# Patient Record
Sex: FEMALE | Race: BLACK OR AFRICAN AMERICAN | NOT HISPANIC OR LATINO | Employment: UNEMPLOYED | URBAN - METROPOLITAN AREA
[De-identification: names, ages, dates, MRNs, and addresses within clinical notes are randomized per-mention and may not be internally consistent; named-entity substitution may affect disease eponyms.]

---

## 2017-01-03 ENCOUNTER — OFFICE VISIT (OUTPATIENT)
Dept: RHEUMATOLOGY | Facility: CLINIC | Age: 42
End: 2017-01-03
Payer: COMMERCIAL

## 2017-01-03 ENCOUNTER — HOSPITAL ENCOUNTER (OUTPATIENT)
Dept: LAB | Facility: CLINIC | Age: 42
Discharge: HOME | End: 2017-01-03
Attending: INTERNAL MEDICINE | Admitting: INTERNAL MEDICINE
Payer: COMMERCIAL

## 2017-01-03 VITALS
HEART RATE: 85 BPM | SYSTOLIC BLOOD PRESSURE: 113 MMHG | TEMPERATURE: 97.4 F | WEIGHT: 187 LBS | BODY MASS INDEX: 30.2 KG/M2 | DIASTOLIC BLOOD PRESSURE: 76 MMHG

## 2017-01-03 DIAGNOSIS — R76.0 ANTINUCLEAR ANTIBODY (ANA) TITER GREATER THAN 1:80: ICD-10-CM

## 2017-01-03 DIAGNOSIS — R76.0 ANTINUCLEAR ANTIBODY (ANA) TITER GREATER THAN 1:80: Primary | ICD-10-CM

## 2017-01-03 LAB
ALBUMIN SERPL-MCNC: 3.6 G/DL (ref 3.4–5)
ALP SERPL-CCNC: 89 U/L (ref 40–150)
ALT SERPL W P-5'-P-CCNC: 21 U/L (ref 0–50)
AST SERPL W P-5'-P-CCNC: 19 U/L (ref 0–45)
BILIRUB DIRECT SERPL-MCNC: <0.1 MG/DL (ref 0–0.2)
BILIRUB SERPL-MCNC: 0.3 MG/DL (ref 0.2–1.3)
HCV AB SERPL QL IA: NORMAL
PROT SERPL-MCNC: 7.9 G/DL (ref 6.8–8.8)

## 2017-01-03 PROCEDURE — 36415 COLL VENOUS BLD VENIPUNCTURE: CPT | Performed by: INTERNAL MEDICINE

## 2017-01-03 PROCEDURE — 83516 IMMUNOASSAY NONANTIBODY: CPT | Performed by: INTERNAL MEDICINE

## 2017-01-03 PROCEDURE — 86235 NUCLEAR ANTIGEN ANTIBODY: CPT | Performed by: INTERNAL MEDICINE

## 2017-01-03 PROCEDURE — 86225 DNA ANTIBODY NATIVE: CPT | Performed by: INTERNAL MEDICINE

## 2017-01-03 PROCEDURE — 86803 HEPATITIS C AB TEST: CPT | Performed by: INTERNAL MEDICINE

## 2017-01-03 PROCEDURE — 99203 OFFICE O/P NEW LOW 30 MIN: CPT | Performed by: INTERNAL MEDICINE

## 2017-01-03 PROCEDURE — 80076 HEPATIC FUNCTION PANEL: CPT | Performed by: INTERNAL MEDICINE

## 2017-01-03 NOTE — PROGRESS NOTES
REFERRING PHYSICIAN:  Gio Valles PA-C.       CHIEF COMPLAINT:  Positive ALEKSANDRA.      HISTORY:  Tye Morales is a 42-year-old female who was in her usual state of health until approximately 6 weeks ago when she developed a sudden onset of joint pain with some mild swelling involving the hands and shoulders.  Was seen by her primary care physician and had labs performed and was found to have a positive ALEKSANDRA of 5.7, normal CPK, normal CBC, sed rate of 30.  Also of note, but not commented on, the followup appointment was that she had an AST of 75 and an ALT of 104.  The patient was placed on 60 mg of prednisone with a taper and had dramatic improvement, which remains until this day.  She says she is a little bit achy today, but is mostly significantly improved as compared to prior to starting the prednisone.        She denies any alcohol intake and any history of liver problems.  She has not had any type of rash, photosensitive rash, malar rash, stomatitis or Raynaud's.  She does not have dry eyes or dry mouth.  There is no history of chest pain, progressive dyspnea or shortness of breath.  She has never had pleurisy, pericarditis, hepatitis, or nephritis.  She has also never had a seizure, stroke or blood clot.      PAST MEDICAL HISTORY:  Iron deficiency anemia, gastroesophageal reflux disease, external hemorrhoids, vitamin D deficiency, allergic rhinitis.      MEDICATIONS:     1.  Meclizine 12.5 mg p.r.n.   2.  Prednisone has been finished.   3.  Ibuprofen.   4.  Iron.   5.  Prenatal vitamins.      DRUG ALLERGIES:  None.      SOCIAL HISTORY:  Is not a smoker or drinker.      FAMILY HISTORY:  There is nobody in the family with lupus.      REVIEW OF SYSTEMS:  As noted above.      PHYSICAL EXAMINATION:     GENERAL:  The patient arises approximately 20 minutes late for her appointment.    VITAL SIGNS:  Blood pressure is 113/76, pulse 85, weight 187.     HEENT:  She is normocephalic, atraumatic.  Sclerae are clear and  moist.  Oropharynx without lesions.   NECK:  Supple without lymphadenopathy.   LUNGS:  Clear.   HEART:  Regular without murmur.   ABDOMEN:  Nontender.   JOINT EXAM:  There is no synovitis, erythema, deformities, tenderness of the wrists, MCPs, or PIPs.  There is no synovitis of the elbows, shoulders, knees or ankles.   SKIN:  Without lesions.      IMPRESSION:  Positive ALEKSANDRA and hepatic dysfunction raising the possibility the patient could have autoimmune hepatitis or related disorder; less likely, but still possible, would be systemic lupus.      RECOMMENDATIONS:    1.  We will go ahead and check a mitochondrial antibody as well as smooth muscle antibody for autoimmune hepatitis.  We also check an NAVIN panel, double stranded DNA, repeat her hepatic studies.    2.  We will defer treatment for now, but if she does to have return of any swelling, stiffness or pain, she needs to let me know.   3.  Further treatment will depend on lab results.         DONNA MEJIA MD             D: 2017 13:10   T: 2017 13:38   MT: PENELOPE#[id number]      Name:     JESUS HERNANDEZ   MRN:      [id number]        Account:      [id number]   :      1975           Visit Date:   2017      Document: [id number]       cc: Gio Valles PA-C

## 2017-01-03 NOTE — MR AVS SNAPSHOT
After Visit Summary   1/3/2017    Tye Morales    MRN: [id number]           Patient Information     Date Of Birth          1975        Visit Information        Provider Department      1/3/2017 9:00 AM René Morejon MD; [employer] [facility]        Today's Diagnoses     Antinuclear antibody (ALEKSANDRA) titer greater than 1:80    -  1        Follow-ups after your visit        Your next 10 appointments already scheduled     Feb 21, 2017  3:30 PM   MA SCREENING DIGITAL BILATERAL with OXMA[facility]   [facility]    [address] [place] [id number]   [phone]           Do not use any powder, lotion or deodorant under your arms or on your breast. If you do, we will ask you to remove it before your exam.  Wear comfortable, two-piece clothing.  If you have any allergies, tell your care team.  Bring any previous mammograms from other facilities or have them mailed to the breast center.            Apr 20, 2017  2:00 PM   LAB with [facility] LAB   [facility]    [address] [place] [id number]   [phone]           Patient must bring picture ID.  Patient should be prepared to give a urine specimen  Please do not eat 10-12 hours before your appointment if you are coming in fasting for labs on lipids, cholesterol, or glucose (sugar).  Pregnant women should follow their Care Team instructions. Water with medications is okay. Do not drink coffee or other fluids.   If you have concerns about taking  your medications, please ask at office or if scheduling via [employer], send a message by clicking on Secure Messaging, Message Your Care Team.            Apr 27, 2017  5:00 PM   Office Visit with Rosa Munguia MD   [facility]    [address] [place] [address]  "[facility] [id number] [phone]           Bring a current list of meds and any records pertaining to this visit.  For Physicals, please bring immunization records and any forms needing to be filled out.  Please arrive 10 minutes early to complete paperwork.              Future tests that were ordered for you today     Open Future Orders        Priority Expected Expires Ordered    Mitochondrial M2 antibody IgG Routine  1/3/2018 1/3/2017    Hepatitis C antibody Routine  1/3/2018 1/3/2017    HCL MITOCHONDRIAL AB W/REFLEX Routine  1/31/2017 1/3/2017    F Actin EAI with reflex Routine  1/3/2018 1/3/2017    Hepatic panel (Albumin, ALT, AST, Bili, Alk Phos, TP) Routine  1/3/2018 1/3/2017    DNA double stranded antibodies Routine  1/3/2018 1/3/2017    NAVIN antibody panel Routine  1/3/2018 1/3/2017    Centromere Antibody IgG Routine  1/3/2018 1/3/2017            Who to contact     If you have questions or need follow up information about today's clinic visit or your schedule please contact [facility] MEDICINE directly at [phone].  Normal or non-critical lab and imaging results will be communicated to you by MyChart, letter or phone within 4 business days after the clinic has received the results. If you do not hear from us within 7 days, please contact the clinic through [employer] or phone. If you have a critical or abnormal lab result, we will notify you by phone as soon as possible.  Submit refill requests through [facility] or call your pharmacy and they will forward the refill request to us. Please allow 3 business days for your refill to be completed.          Additional Information About Your Visit        MyChart Information     [facility] lets you send messages to your doctor, view your test results, renew your prescriptions, schedule appointments and more. To sign up, go to [email or web address] . Click on \"Log in\" on the left side of the screen, which will take you to the Welcome page. " "Then click on \"Sign up Now\" on the right side of the page.     You will be asked to enter the access code listed below, as well as some personal information. Please follow the directions to create your username and password.     Your access code is: [email or web address]  Expires: 2017  3:55 AM     Your access code will  in 90 days. If you need help or a new code, please call your [facility] clinic or [phone].        Care EveryWhere ID     This is your Care EveryWhere ID. This could be used by other organizations to access your [facility] medical records  [id number]        Your Vitals Were     Pulse Temperature                85 97.4  F (36.3  C)           Blood Pressure from Last 3 Encounters:   17 113/76   16 123/86   16 96/58    Weight from Last 3 Encounters:   17 84.823 kg (187 lb)   16 84.369 kg (186 lb)   16 82.283 kg (181 lb 6.4 oz)               Primary Care Provider Office Phone # Fax #    Rosa Munguia [phone]       [facility] [address] [facility] [address]        Thank you!     Thank you for choosing [facility]  for your care. Our goal is always to provide you with excellent care. Hearing back from our patients is one way we can continue to improve our services. Please take a few minutes to complete the written survey that you may receive in the mail after your visit with us. Thank you!             Your Updated Medication List - Protect others around you: Learn how to safely use, store and throw away your medicines at www.disposemymeds.org.          This list is accurate as of: 1/3/17  3:13 PM.  Always use your most recent med list.                   Brand Name Dispense Instructions for use    Calcium Carb-Cholecalciferol 1000-800 MG-UNIT Tabs    CALCIUM 1000 + D    100 tablet    Take 1 tablet by mouth daily TAKE WITH FOOD, FOR BONE HEALTH AND LOW VITAMIN D (SAMIR FUENTES)       " cholecalciferol 5000 UNITS Caps     100 capsule    Take 1 capsule (5,000 Units) by mouth daily FOR VITAMIN D DEFICIENCY (LOW VITAMIN D)       Cyanocobalamin 5000 MCG/ML Liqd    B-12 SUPER STRENGTH    2 Bottle    Place 1 mL under the tongue daily FOR VITAMIN B12 SUPPLEMENTATION, PLEASE PLACE UNDER THE TONGUE       cyclobenzaprine 5 MG tablet    FLEXERIL    42 tablet    Take 1 tablet (5 mg) by mouth nightly as needed for muscle spasms       ferrous sulfate 325 (65 FE) MG tablet    IRON    90 tablet    Take 1 tablet (325 mg) by mouth daily (with breakfast) IRON SUPPLEMENT, TAKE WITH 4 OZ OF APPLE JUICE       fluticasone 50 MCG/ACT spray    FLONASE    16 g    Spray 2 sprays in nostril At Bedtime INDICATION: TO CONTROL NASAL ALLERGY SYMPTOMS, DO NOT BLOW NOSE FOR 30 MINUTES AFTER USING       * ibuprofen 600 MG tablet    ADVIL/MOTRIN    90 tablet    Take 1 tablet (600 mg) by mouth every 8 hours as needed for moderate pain MUST TAKE WITH FOOD       * ibuprofen 800 MG tablet    ADVIL/MOTRIN    60 tablet    Take 1 tablet (800 mg) by mouth every 8 hours as needed for moderate pain       meclizine 12.5 MG tablet    ANTIVERT    30 tablet    Take 1 tablet (12.5 mg) by mouth 4 times daily as needed for dizziness       omeprazole 20 MG CR capsule    priLOSEC    90 capsule    Take 1 capsule (20 mg) by mouth every morning (before breakfast) INDICATION: TO CONTROL REFLUX SYMPTOMS       predniSONE 20 MG tablet    DELTASONE    20 tablet    Take 3 tabs (60 mg) orally daily for 3 days, 2 tabs (40 mg) orally daily for 3 days, 1 tab (20 mg) orally daily for 3 days, then 1/2 tab (10 mg) orally for 3 days       PRENATAL LOW IRON 27-1 MG Tabs     90 tablet    Take 1 tablet by mouth daily INDICATION: VITAMIN SUPPLEMENTATION       pyridOXINE 25 MG tablet    vitamin B-6    30 tablet    Take 1 tablet (25 mg) by mouth daily INDICATION: VITAMIN B6 DEFICIENCY       * Notice:  This list has 2 medication(s) that are the same as other medications  prescribed for you. Read the directions carefully, and ask your doctor or other care provider to review them with you.

## 2017-01-03 NOTE — PROGRESS NOTES
"Chief Complaint   Patient presents with     Consult     ref. [place] joint pain       Initial /76 mmHg  Pulse 85  Temp(Src) 97.4  F (36.3  C)  Wt 84.823 kg (187 lb) Estimated body mass index is 30.2 kg/(m^2) as calculated from the following:    Height as of 12/21/16: 1.676 m (5' 6\").    Weight as of this encounter: 84.823 kg (187 lb).      Patient prefers to be contacted via at Home.   Okay to leave detailed message: Yes  Patient uses MyChart: No    Leonora VO LPN      "

## 2017-01-04 LAB
CENTROMERE IGG SER-ACNC: NORMAL AI (ref 0–0.9)
DSDNA AB SER-ACNC: 1 IU/ML
ENA RNP IGG SER IA-ACNC: ABNORMAL AI (ref 0–0.9)
ENA SCL70 IGG SER IA-ACNC: ABNORMAL AI (ref 0–0.9)
ENA SM IGG SER-ACNC: ABNORMAL AI (ref 0–0.9)
ENA SS-A IGG SER IA-ACNC: 7.9 AI (ref 0–0.9)
ENA SS-B IGG SER IA-ACNC: ABNORMAL AI (ref 0–0.9)
MITOCHONDRIA M2 IGG SER-ACNC: 16.5
SMA IGG SER-ACNC: 14

## 2017-01-05 ENCOUNTER — TELEPHONE (OUTPATIENT)
Dept: RHEUMATOLOGY | Facility: CLINIC | Age: 42
End: 2017-01-05

## 2017-01-05 NOTE — TELEPHONE ENCOUNTER
Left message per Dr. Morejon:    The liver is back to normal.  One of the tests for lupus--called an SSA antibody is positive which suggests that probably why she was hurting.  It may or may not act up again.  There is treatment with plaquenil if she is having problems.  She should definitely let me know if she starts hurting more.  She may want to make a f/u appointment to discuss further      Leonora VO LPN

## 2017-02-14 ENCOUNTER — TELEPHONE (OUTPATIENT)
Dept: RHEUMATOLOGY | Facility: CLINIC | Age: 42
End: 2017-02-14

## 2017-02-14 ENCOUNTER — HOSPITAL ENCOUNTER (OUTPATIENT)
Dept: LAB | Facility: CLINIC | Age: 42
Discharge: HOME | End: 2017-02-14
Attending: INTERNAL MEDICINE | Admitting: INTERNAL MEDICINE
Payer: COMMERCIAL

## 2017-02-14 ENCOUNTER — OFFICE VISIT (OUTPATIENT)
Dept: RHEUMATOLOGY | Facility: CLINIC | Age: 42
End: 2017-02-14
Payer: COMMERCIAL

## 2017-02-14 VITALS
WEIGHT: 187 LBS | SYSTOLIC BLOOD PRESSURE: 121 MMHG | HEART RATE: 88 BPM | DIASTOLIC BLOOD PRESSURE: 76 MMHG | BODY MASS INDEX: 30.18 KG/M2

## 2017-02-14 DIAGNOSIS — M32.9 SYSTEMIC LUPUS ERYTHEMATOSUS (H): Primary | ICD-10-CM

## 2017-02-14 DIAGNOSIS — M32.9 SYSTEMIC LUPUS ERYTHEMATOSUS (H): ICD-10-CM

## 2017-02-14 LAB
ALBUMIN SERPL-MCNC: 3.3 G/DL (ref 3.4–5)
ALBUMIN UR-MCNC: NEGATIVE MG/DL
ALP SERPL-CCNC: 127 U/L (ref 40–150)
ALT SERPL W P-5'-P-CCNC: 65 U/L (ref 0–50)
ANION GAP SERPL CALCULATED.3IONS-SCNC: 6 MMOL/L (ref 3–14)
APPEARANCE UR: CLEAR
AST SERPL W P-5'-P-CCNC: 32 U/L (ref 0–45)
BILIRUB SERPL-MCNC: 0.2 MG/DL (ref 0.2–1.3)
BILIRUB UR QL STRIP: NEGATIVE
BUN SERPL-MCNC: 10 MG/DL (ref 7–30)
CALCIUM SERPL-MCNC: 8.5 MG/DL (ref 8.5–10.1)
CHLORIDE SERPL-SCNC: 105 MMOL/L (ref 94–109)
CO2 SERPL-SCNC: 29 MMOL/L (ref 20–32)
COLOR UR AUTO: YELLOW
CREAT SERPL-MCNC: 0.52 MG/DL (ref 0.52–1.04)
GFR SERPL CREATININE-BSD FRML MDRD: ABNORMAL ML/MIN/1.7M2
GLUCOSE SERPL-MCNC: 84 MG/DL (ref 70–99)
GLUCOSE UR STRIP-MCNC: NEGATIVE MG/DL
HGB UR QL STRIP: NEGATIVE
KETONES UR STRIP-MCNC: NEGATIVE MG/DL
LEUKOCYTE ESTERASE UR QL STRIP: NEGATIVE
NITRATE UR QL: NEGATIVE
PH UR STRIP: 5.5 PH (ref 5–7)
POTASSIUM SERPL-SCNC: 3.6 MMOL/L (ref 3.4–5.3)
PROT SERPL-MCNC: 7.5 G/DL (ref 6.8–8.8)
SODIUM SERPL-SCNC: 140 MMOL/L (ref 133–144)
SP GR UR STRIP: 1.02 (ref 1–1.03)
URN SPEC COLLECT METH UR: NORMAL
UROBILINOGEN UR STRIP-MCNC: NORMAL MG/DL (ref 0–2)

## 2017-02-14 PROCEDURE — 36415 COLL VENOUS BLD VENIPUNCTURE: CPT | Performed by: INTERNAL MEDICINE

## 2017-02-14 PROCEDURE — 99213 OFFICE O/P EST LOW 20 MIN: CPT | Performed by: INTERNAL MEDICINE

## 2017-02-14 PROCEDURE — 81003 URINALYSIS AUTO W/O SCOPE: CPT | Performed by: INTERNAL MEDICINE

## 2017-02-14 PROCEDURE — 80053 COMPREHEN METABOLIC PANEL: CPT | Performed by: INTERNAL MEDICINE

## 2017-02-14 RX ORDER — HYDROXYCHLOROQUINE SULFATE 200 MG/1
400 TABLET, FILM COATED ORAL 2 TIMES DAILY
Qty: 60 TABLET | Refills: 3 | Status: SHIPPED | OUTPATIENT
Start: 2017-02-14 | End: 2017-02-14

## 2017-02-14 RX ORDER — HYDROXYCHLOROQUINE SULFATE 200 MG/1
400 TABLET, FILM COATED ORAL DAILY
Qty: 60 TABLET | Refills: 3 | Status: SHIPPED | OUTPATIENT
Start: 2017-02-14 | End: 2017-05-12

## 2017-02-14 NOTE — MR AVS SNAPSHOT
After Visit Summary   2/14/2017    Tye Morales    MRN: [id number]           Patient Information     Date Of Birth          1975        Visit Information        Provider Department      2/14/2017 12:30 PM René Morejon MD; [employer] [facility] SP[facility]TS MEDICINE        Today's Diagnoses     Systemic lupus erythematosus (H)    -  1       Follow-ups after your visit        Your next 10 appointments already scheduled     Feb 21, 2017  3:30 PM CST   MA SCREENING DIGITAL BILATERAL with OXMA1   [facility]    [address] [place] [address]   [phone]           Do not use any powder, lotion or deodorant under your arms or on your breast. If you do, we will ask you to remove it before your exam.  Wear comfortable, two-piece clothing.  If you have any allergies, tell your care team.  Bring any previous mammograms from other facilities or have them mailed to the breast center.            Apr 20, 2017  2:00 PM CDT   LAB with OXLIAMO LAB   [address] [address]   [phone]           Patient must bring picture ID.  Patient should be prepared to give a urine specimen  Please do not eat 10-12 hours before your appointment if you are coming in fasting for labs on lipids, cholesterol, or glucose (sugar).  Pregnant women should follow their Care Team instructions. Water with medications is okay. Do not drink coffee or other fluids.   If you have concerns about taking  your medications, please ask at office or if scheduling via [employer], send a message by clicking on Secure Messaging, Message Your Care Team.            Apr 27, 2017  5:00 PM CDT   Office Visit with Rosa Munguia MD   [facility] ([address]  "[place] [phone]           Bring a current list of meds and any records pertaining to this visit.  For Physicals, please bring immunization records and any forms needing to be filled out.  Please arrive 10 minutes early to complete paperwork.            May 09, 2017  1:00 PM CDT   Return Visit with René Morejon MD   [facility] ([facility]    [address], [address]  [facility] [id number] [phone]           Please inform patient of the clinic address: [facility] [address] , [address] [facility] [address]              Future tests that were ordered for you today     Open Future Orders        Priority Expected Expires Ordered    Comprehensive metabolic panel Routine  2/14/2018 2/14/2017    UA reflex to Microscopic Routine  2/14/2018 2/14/2017            Who to contact     If you have questions or need follow up information about today's clinic visit or your schedule please contact [facility] directly at [phone].  Normal or non-critical lab and imaging results will be communicated to you by [employer], letter or phone within 4 business days after the clinic has received the results. If you do not hear from us within 7 days, please contact the clinic through [employer] or phone. If you have a critical or abnormal lab result, we will notify you by phone as soon as possible.  Submit refill requests through [employer] or call your pharmacy and they will forward the refill request to us. Please allow 3 business days for your refill to be completed.          Additional Information About Your Visit        [employer] Information     [employer] lets you send messages to your doctor, view your test results, renew your prescriptions, schedule appointments and more. To sign up, go to [email or web address] . Click on \"Log in\" on the left side of the screen, which will take you to the Welcome " "page. Then click on \"Sign up Now\" on the right side of the page.     You will be asked to enter the access code listed below, as well as some personal information. Please follow the directions to create your username and password.     Your access code is: [email or web address]  Expires: 2017  8:29 AM     Your access code will  in 90 days. If you need help or a new code, please call your [facility] or [phone].        Care EveryWhere ID     This is your Care EveryWhere ID. This could be used by other organizations to access your [facility] medical records  [id number]        Your Vitals Were     Pulse BMI (Body Mass Index)                88 30.18 kg/m2           Blood Pressure from Last 3 Encounters:   17 121/76   17 113/76   16 123/86    Weight from Last 3 Encounters:   17 84.8 kg (187 lb)   17 84.8 kg (187 lb)   16 84.4 kg (186 lb)                 Today's Medication Changes          These changes are accurate as of: 17 11:59 PM.  If you have any questions, ask your nurse or doctor.               Start taking these medicines.        Dose/Directions    hydroxychloroquine 200 MG tablet   Commonly known as:  PLAQUENIL   Used for:  Systemic lupus erythematosus (H)   Started by:  René Morejon MD        Dose:  400 mg   Take 2 tablets (400 mg) by mouth daily   Quantity:  60 tablet   Refills:  3            Where to get your medicines      These medications were sent to [facility] [address] [address]     Phone:  [phone]     hydroxychloroquine 200 MG tablet                Primary Care Provider Office Phone # Fax #    Rosa Munguia [phone]       [facility] [address] [facility] [address]        Thank you!     Thank you for choosing [facility] MEDICINE  for your care. Our goal is always to provide you with excellent care. " Hearing back from our patients is one way we can continue to improve our services. Please take a few minutes to complete the written survey that you may receive in the mail after your visit with us. Thank you!             Your Updated Medication List - Protect others around you: Learn how to safely use, store and throw away your medicines at www.disposemymeds.org.          This list is accurate as of: 2/14/17 11:59 PM.  Always use your most recent med list.                   Brand Name Dispense Instructions for use    Calcium Carb-Cholecalciferol 1000-800 MG-UNIT Tabs    CALCIUM 1000 + D    100 tablet    Take 1 tablet by mouth daily TAKE WITH FOOD, FOR BONE HEALTH AND LOW VITAMIN D ([facility])       cholecalciferol 5000 UNITS Caps     100 capsule    Take 1 capsule (5,000 Units) by mouth daily FOR VITAMIN D DEFICIENCY (LOW VITAMIN D)       Cyanocobalamin 5000 MCG/ML Liqd    B-12 SUPER STRENGTH    2 Bottle    Place 1 mL under the tongue daily FOR VITAMIN B12 SUPPLEMENTATION, PLEASE PLACE UNDER THE TONGUE       cyclobenzaprine 5 MG tablet    FLEXERIL    42 tablet    Take 1 tablet (5 mg) by mouth nightly as needed for muscle spasms       ferrous sulfate 325 (65 FE) MG tablet    IRON    90 tablet    Take 1 tablet (325 mg) by mouth daily (with breakfast) IRON SUPPLEMENT, TAKE WITH 4 OZ OF APPLE JUICE       fluticasone 50 MCG/ACT spray    FLONASE    16 g    Spray 2 sprays in nostril At Bedtime INDICATION: TO CONTROL NASAL ALLERGY SYMPTOMS, DO NOT BLOW NOSE FOR 30 MINUTES AFTER USING       hydroxychloroquine 200 MG tablet    PLAQUENIL    60 tablet    Take 2 tablets (400 mg) by mouth daily       * ibuprofen 600 MG tablet    ADVIL/MOTRIN    90 tablet    Take 1 tablet (600 mg) by mouth every 8 hours as needed for moderate pain MUST TAKE WITH FOOD       * ibuprofen 800 MG tablet    ADVIL/MOTRIN    60 tablet    Take 1 tablet (800 mg) by mouth every 8 hours as needed for moderate pain       meclizine 12.5 MG  tablet    ANTIVERT    30 tablet    Take 1 tablet (12.5 mg) by mouth 4 times daily as needed for dizziness       omeprazole 20 MG CR capsule    priLOSEC    90 capsule    Take 1 capsule (20 mg) by mouth every morning (before breakfast) INDICATION: TO CONTROL REFLUX SYMPTOMS       predniSONE 20 MG tablet    DELTASONE    20 tablet    Take 3 tabs (60 mg) orally daily for 3 days, 2 tabs (40 mg) orally daily for 3 days, 1 tab (20 mg) orally daily for 3 days, then 1/2 tab (10 mg) orally for 3 days       PRENATAL LOW IRON 27-1 MG Tabs     90 tablet    Take 1 tablet by mouth daily INDICATION: VITAMIN SUPPLEMENTATION       pyridOXINE 25 MG tablet    vitamin B-6    30 tablet    Take 1 tablet (25 mg) by mouth daily INDICATION: VITAMIN B6 DEFICIENCY       * Notice:  This list has 2 medication(s) that are the same as other medications prescribed for you. Read the directions carefully, and ask your doctor or other care provider to review them with you.

## 2017-02-14 NOTE — TELEPHONE ENCOUNTER
Reason for Call:  Patient is calling in to verify sig and qty on plaquenil    Detailed comments: please return call to pharmacy    Phone Number Patient can be reached at: [phone]    Best Time: NA    Can we leave a detailed message on this number? Not Applicable    Call taken on 2/14/2017 at 2:02 PM by Kori Garcia

## 2017-02-14 NOTE — PROGRESS NOTES
HISTORY OF PRESENT ILLNESS:  Ms. Jesus Morales is seen back in followup to review her status.  Discussion occurs with the help of an .  Her SSA antibody was positive, which was suggestive that of a diagnosis of lupus.  She is back because she is still having pain and stiffness in her hands and are still pain in her shoulders.  She also is complaining of pain in the wrist region and also proximal to this.        Tests for autoimmune hepatitis were in fact negative.  SSA antibody was 7.9.  She still has not developed a malar rash or photosensitive rash or she develops stomatitis.  She is not having any chest pain or shortness of breath or dyspnea.  There is no dysuria or hematuria.      PHYSICAL EXAMINATION:     VITALS:  Blood pressure 121/76, pulse 88.   MUSCULOSKELETAL:  Her hands do seem somewhat stiff and her  is weak but there is no obvious synovitis or deformities of the wrists, MCPs, or PIPs.   SKIN:  Without lesions.      IMPRESSION:   1.  Systemic lupus.      RECOMMENDATIONS:     1.  Risks, benefits, side effects of Plaquenil therapy discussed including but not limited to nausea, vomiting, diarrhea, hypersensitivity reactions, a photosensitization, headaches, retinal toxicity.  We will start her on 40 mg daily with onset of action 2-6 months.     2.  Prednisone offered but will be deferred for now.     3.  Continue to use NSAIDs if helpful.     4.  Followup in 3 months to assess response to therapy.   5.  Educational material provided regarding lupus.         DONNA MEJIA MD             D: 2017 14:49   T: 2017 15:52   MT: PENELOPE#[id number]      Name:     JESUS MORALES   MRN:      [id number]        Account:      [id number]   :      1975           Visit Date:   2017      Document: [id number]

## 2017-02-14 NOTE — NURSING NOTE
"Chief Complaint   Patient presents with     RECHECK   states she is still having pain in hands      Initial /76  Pulse 88  Wt 84.8 kg (187 lb)  BMI 30.18 kg/m2 Estimated body mass index is 30.18 kg/(m^2) as calculated from the following:    Height as of 12/21/16: 1.676 m (5' 6\").    Weight as of this encounter: 84.8 kg (187 lb).      Patient prefers to be contacted via at Home.   Okay to leave detailed message: Yes  Patient uses MyChart: No    Leonora VO LPN    "

## 2017-04-20 DIAGNOSIS — K21.9 GASTROESOPHAGEAL REFLUX DISEASE WITHOUT ESOPHAGITIS: ICD-10-CM

## 2017-05-12 VITALS
HEART RATE: 81 BPM | SYSTOLIC BLOOD PRESSURE: 119 MMHG | RESPIRATION RATE: 18 BRPM | OXYGEN SATURATION: 99 % | TEMPERATURE: 98 F | DIASTOLIC BLOOD PRESSURE: 84 MMHG

## 2017-05-12 PROCEDURE — 99283 EMERGENCY DEPT VISIT LOW MDM: CPT

## 2017-05-13 ENCOUNTER — HOSPITAL ENCOUNTER (EMERGENCY)
Facility: CLINIC | Age: 42
Discharge: HOME | End: 2017-05-13
Attending: EMERGENCY MEDICINE | Admitting: EMERGENCY MEDICINE
Payer: COMMERCIAL

## 2017-05-13 DIAGNOSIS — R10.30 LOWER ABDOMINAL PAIN: ICD-10-CM

## 2017-05-13 LAB
ALBUMIN UR-MCNC: NEGATIVE MG/DL
APPEARANCE UR: ABNORMAL
BILIRUB UR QL STRIP: NEGATIVE
COLOR UR AUTO: YELLOW
GLUCOSE UR STRIP-MCNC: NEGATIVE MG/DL
HCG UR QL: NEGATIVE
HGB UR QL STRIP: NEGATIVE
KETONES UR STRIP-MCNC: NEGATIVE MG/DL
LEUKOCYTE ESTERASE UR QL STRIP: NEGATIVE
MUCOUS THREADS #/AREA URNS LPF: PRESENT /LPF
NITRATE UR QL: NEGATIVE
PH UR STRIP: 5 PH (ref 5–7)
RBC #/AREA URNS AUTO: <1 /HPF (ref 0–2)
SP GR UR STRIP: 1.03 (ref 1–1.03)
SQUAMOUS #/AREA URNS AUTO: <1 /HPF (ref 0–1)
URN SPEC COLLECT METH UR: ABNORMAL
UROBILINOGEN UR STRIP-MCNC: 2 MG/DL (ref 0–2)
WBC #/AREA URNS AUTO: 0 /HPF (ref 0–2)

## 2017-05-13 PROCEDURE — 81025 URINE PREGNANCY TEST: CPT | Performed by: EMERGENCY MEDICINE

## 2017-05-13 PROCEDURE — 81001 URINALYSIS AUTO W/SCOPE: CPT | Performed by: EMERGENCY MEDICINE

## 2017-05-13 ASSESSMENT — ENCOUNTER SYMPTOMS
DYSURIA: 1
FEVER: 0
ABDOMINAL PAIN: 1

## 2017-05-13 NOTE — ED NOTES
"Pt complains of several days of lower abd pain, also notes she was due for her period 5/9 (last 4/11) and is concerned for \"infection\" as she is late. Unsure about pregnancy. Constipation, small hard BM today  "

## 2017-05-13 NOTE — ED AVS SNAPSHOT
[facility] Emergency Department    [address]    [facility] [address]    Phone:  [phone]    Fax:  [phone]                                       Tye Morales   MRN: [id number]    Department:  [facility] Emergency Department   Date of Visit:  5/12/2017           After Visit Summary Signature Page     I have received my discharge instructions, and my questions have been answered. I have discussed any challenges I see with this plan with the nurse or doctor.    ..........................................................................................................................................  Patient/Patient Representative Signature      ..........................................................................................................................................  Patient Representative Print Name and Relationship to Patient    ..................................................               ................................................  Date                                            Time    ..........................................................................................................................................  Reviewed by Signature/Title    ...................................................              ..............................................  Date                                                            Time

## 2017-05-13 NOTE — DISCHARGE INSTRUCTIONS
Abdominal Pain  Abdominal pain is pain in the stomach or intestinal area. Everyone has this pain from time to time. In many cases it goes away on its own. But abdominal pain can sometimes be due to a serious problem, such as appendicitis. So it s important to know when to seek help.  Causes of abdominal pain  There are many possible causes of abdominal pain. Common causes in adults include:    Constipation, diarrhea, or gas    GERD (gastroesophageal reflux disease) movement of stomach acid into the esophagus, also known as acid reflux or heartburn    Peptic ulcer (a sore in the lining of the stomach or small intestine)    Inflammation of the gallbladder, liver, or pancreas    Gallstones or kidney stones    Appendicitis     Obstruction of the intestines     Hernia (bulging of an internal organ through a muscle or other tissue)    Urinary tract infections    In women, menstrual cramps, fibroids, or endometriosis of the uterus    Inflammation or infection of the intestines  Diagnosing the cause of abdominal pain  Your health care provider will examine you to help find the cause of your pain. If needed, tests will be ordered. Because abdominal pain has so many possible causes, it can be hard to discover the reason for the pain. Giving details about your pain can help. Be ready to tell your health care provider where and when you feel the pain and what makes it better or worse. Also mention whether you have other symptoms such as fever, tiredness, nausea, vomiting, or changes in bathroom habits.  Treating abdominal pain  Certain causes of pain, such as appendicitis or a bowel obstruction, need emergency treatment. Other problems can be treated with rest, fluids, or medications. Your health care provider can give you specific instructions for treatment or self-care based on the cause of your pain.  If you have vomiting or diarrhea, sip water or other clear fluids. When you are ready to eat solid foods again, start with  small amounts of easy-to-digest, low-fat foods, such as applesauce, toast, or crackers.   When to call the doctor  Call 911 or go to the hospital right away if you:    Can t pass stool and are vomiting    Are vomiting blood or have black, tarry diarrhea    Also have chest, neck, or shoulder pain    Feel like you are about to pass out    Have pain in your shoulder blades with nausea    Have sudden, excruciating abdominal pain    Have new, severe pain unlike any you have felt before    Have a belly that is rigid, hard, and tender to touch  Call your doctor if you have:    Pain for more than 5 days    Bloating for more than 2 days    Diarrhea for more than 5 days    Fever of 101 F (38.3 C) or higher    Pain that continues to worsen    Unexplained weight loss    Continued lack of appetite    Blood in the stool  How to prevent abdominal pain  Here are some tips to help prevent abdominal pain:    Eat smaller amounts of food at one time.    Avoid greasy, fried, or other high-fat foods.    Avoid foods that give you gas.    Exercise regularly.    Drink plenty of fluids.  To help prevent symptoms of gastroesophageal reflux disease (GERD):    Quit smoking.    Reduce alcohol and certain foods that increase stomach acid.     Lose excess weight.    Finish eating at least 2 hours before you go to bed or lie down.    Elevate the head of your bed.    [id number] [employer]. [address] [place] [address], [place] [address]. All rights reserved. This information is not intended as a substitute for professional medical care. Always follow your healthcare professional's instructions.

## 2017-05-13 NOTE — ED AVS SNAPSHOT
[facility] Emergency Department    [address]    [facility] [address]    Phone:  [phone]    Fax:  [phone]                                       Tye Morales   MRN: [id number]    Department:  [facility] Emergency Department   Date of Visit:  5/12/2017           Patient Information     Date Of Birth          1975        Your diagnoses for this visit were:     Lower abdominal pain        You were seen by Rebel Dowell MD.      Follow-up Information     Follow up with Rosa Munguia MD In 3 days.    Specialties:  Internal Medicine, Pediatrics    Why:  If symptoms worsen or continue, return earlier to ED if symptoms worsen, fevers develop, blood in stools    Contact information:    [facility]  [address] [facility] [address]  [phone]          Discharge Instructions         Abdominal Pain  Abdominal pain is pain in the stomach or intestinal area. Everyone has this pain from time to time. In many cases it goes away on its own. But abdominal pain can sometimes be due to a serious problem, such as appendicitis. So it s important to know when to seek help.  Causes of abdominal pain  There are many possible causes of abdominal pain. Common causes in adults include:    Constipation, diarrhea, or gas    GERD (gastroesophageal reflux disease) movement of stomach acid into the esophagus, also known as acid reflux or heartburn    Peptic ulcer (a sore in the lining of the stomach or small intestine)    Inflammation of the gallbladder, liver, or pancreas    Gallstones or kidney stones    Appendicitis     Obstruction of the intestines     Hernia (bulging of an internal organ through a muscle or other tissue)    Urinary tract infections    In women, menstrual cramps, fibroids, or endometriosis of the uterus    Inflammation or infection of the intestines  Diagnosing the cause of abdominal pain  Your health care provider will examine you to  help find the cause of your pain. If needed, tests will be ordered. Because abdominal pain has so many possible causes, it can be hard to discover the reason for the pain. Giving details about your pain can help. Be ready to tell your health care provider where and when you feel the pain and what makes it better or worse. Also mention whether you have other symptoms such as fever, tiredness, nausea, vomiting, or changes in bathroom habits.  Treating abdominal pain  Certain causes of pain, such as appendicitis or a bowel obstruction, need emergency treatment. Other problems can be treated with rest, fluids, or medications. Your health care provider can give you specific instructions for treatment or self-care based on the cause of your pain.  If you have vomiting or diarrhea, sip water or other clear fluids. When you are ready to eat solid foods again, start with small amounts of easy-to-digest, low-fat foods, such as applesauce, toast, or crackers.   When to call the doctor  Call 911 or go to the hospital right away if you:    Can t pass stool and are vomiting    Are vomiting blood or have black, tarry diarrhea    Also have chest, neck, or shoulder pain    Feel like you are about to pass out    Have pain in your shoulder blades with nausea    Have sudden, excruciating abdominal pain    Have new, severe pain unlike any you have felt before    Have a belly that is rigid, hard, and tender to touch  Call your doctor if you have:    Pain for more than 5 days    Bloating for more than 2 days    Diarrhea for more than 5 days    Fever of 101 F (38.3 C) or higher    Pain that continues to worsen    Unexplained weight loss    Continued lack of appetite    Blood in the stool  How to prevent abdominal pain  Here are some tips to help prevent abdominal pain:    Eat smaller amounts of food at one time.    Avoid greasy, fried, or other high-fat foods.    Avoid foods that give you gas.    Exercise regularly.    Drink plenty of  fluids.  To help prevent symptoms of gastroesophageal reflux disease (GERD):    Quit smoking.    Reduce alcohol and certain foods that increase stomach acid.     Lose excess weight.    Finish eating at least 2 hours before you go to bed or lie down.    Elevate the head of your bed.    [id number] [employer]. [address] [place] [address]. All rights reserved. This information is not intended as a substitute for professional medical care. Always follow your healthcare professional's instructions.          Future Appointments        Provider Department Dept Phone Center    6/6/2017 1:00 PM René Morejon MD [facility] SPORTS MEDICINE [phone] [facility]    6/23/2017 11:00 AM [facility] [phone]     6/27/2017 5:30 PM Rosa Munguia MD [facility] [phone]       24 Hour Appointment Hotline       To make an appointment at any [facility], call [phone] ([phone]). If you don't have a family doctor or clinic, we will help you find one. [facility] are conveniently located to serve the needs of you and your family.             Review of your medicines      Our records show that you are taking the medicines listed below. If these are incorrect, please call your family doctor or clinic.        Dose / Directions Last dose taken    Calcium Carb-Cholecalciferol 1000-800 MG-UNIT Tabs   Commonly known as:  CALCIUM 1000 + D   Dose:  1 tablet   Quantity:  100 tablet        Take 1 tablet by mouth daily TAKE WITH FOOD, FOR BONE HEALTH AND LOW VITAMIN D ([facility])   Refills:  PRN        cholecalciferol 5000 UNITS Caps   Dose:  1 capsule   Quantity:  100 capsule        Take 1 capsule (5,000 Units) by mouth daily FOR VITAMIN D DEFICIENCY (LOW VITAMIN D)   Refills:  3        Cyanocobalamin 5000 MCG/ML Liqd   Commonly known as:  B-12 SUPER STRENGTH   Dose:  1 mL   Quantity:  2  Bottle        Place 1 mL under the tongue daily FOR VITAMIN B12 SUPPLEMENTATION, PLEASE PLACE UNDER THE TONGUE   Refills:  PRN        cyclobenzaprine 5 MG tablet   Commonly known as:  FLEXERIL   Dose:  5 mg   Quantity:  42 tablet        Take 1 tablet (5 mg) by mouth nightly as needed for muscle spasms   Refills:  0        ferrous sulfate 325 (65 FE) MG tablet   Commonly known as:  IRON   Dose:  1 tablet   Quantity:  90 tablet        Take 1 tablet (325 mg) by mouth daily (with breakfast) IRON SUPPLEMENT, TAKE WITH 4 OZ OF APPLE JUICE   Refills:  2        fluticasone 50 MCG/ACT spray   Commonly known as:  FLONASE   Dose:  2 spray   Quantity:  16 g        Spray 2 sprays in nostril At Bedtime INDICATION: TO CONTROL NASAL ALLERGY SYMPTOMS, DO NOT BLOW NOSE FOR 30 MINUTES AFTER USING   Refills:  PRN        * ibuprofen 600 MG tablet   Commonly known as:  ADVIL/MOTRIN   Dose:  600 mg   Quantity:  90 tablet        Take 1 tablet (600 mg) by mouth every 8 hours as needed for moderate pain MUST TAKE WITH FOOD   Refills:  1        * ibuprofen 800 MG tablet   Commonly known as:  ADVIL/MOTRIN   Dose:  800 mg   Quantity:  60 tablet        Take 1 tablet (800 mg) by mouth every 8 hours as needed for moderate pain   Refills:  1        meclizine 12.5 MG tablet   Commonly known as:  ANTIVERT   Dose:  12.5 mg   Quantity:  30 tablet        Take 1 tablet (12.5 mg) by mouth 4 times daily as needed for dizziness   Refills:  0        omeprazole 20 MG CR capsule   Commonly known as:  priLOSEC   Quantity:  90 capsule        TAKE ONE CAPSULE BY MOUTH EVERY MORNING BEFORE BREAKFAST   Refills:  1        predniSONE 20 MG tablet   Commonly known as:  DELTASONE   Quantity:  20 tablet        Take 3 tabs (60 mg) orally daily for 3 days, 2 tabs (40 mg) orally daily for 3 days, 1 tab (20 mg) orally daily for 3 days, then 1/2 tab (10 mg) orally for 3 days   Refills:  0        PRENATAL LOW IRON 27-1 MG Tabs   Dose:  1 tablet   Quantity:  90 tablet         Take 1 tablet by mouth daily INDICATION: VITAMIN SUPPLEMENTATION   Refills:  prn        pyridOXINE 25 MG tablet   Commonly known as:  vitamin B-6   Dose:  25 mg   Quantity:  30 tablet        Take 1 tablet (25 mg) by mouth daily INDICATION: VITAMIN B6 DEFICIENCY   Refills:  2        * Notice:  This list has 2 medication(s) that are the same as other medications prescribed for you. Read the directions carefully, and ask your doctor or other care provider to review them with you.            Procedures and tests performed during your visit     HCG qualitative urine    UA with Microscopic      Orders Needing Specimen Collection     None      Pending Results     No orders found from 5/11/2017 to 5/14/2017.            Pending Culture Results     No orders found from 5/11/2017 to 5/14/2017.            Pending Results Instructions     If you had any lab results that were not finalized at the time of your Discharge, you can call the ED Lab Result RN at [phone]. You will be contacted by this team for any positive Lab results or changes in treatment. The nurses are available 7 days a week from 10A to 6:30P.  You can leave a message 24 hours per day and they will return your call.        Test Results From Your Hospital Stay        5/13/2017  2:07 AM      Component Results     Component Value Ref Range & Units Status    HCG Qual Urine Negative NEG Final         5/13/2017  2:08 AM      Component Results     Component Value Ref Range & Units Status    Color Urine Yellow  Final    Appearance Urine Slightly Cloudy  Final    Glucose Urine Negative NEG mg/dL Final    Bilirubin Urine Negative NEG Final    Ketones Urine Negative NEG mg/dL Final    Specific Gravity Urine 1.028 1.003 - 1.035 Final    Blood Urine Negative NEG Final    pH Urine 5.0 5.0 - 7.0 pH Final    Protein Albumin Urine Negative NEG mg/dL Final    Urobilinogen mg/dL 2.0 0.0 - 2.0 mg/dL Final    Nitrite Urine Negative NEG Final    Leukocyte Esterase Urine Negative  NEG Final    Source Midstream Urine  Final    WBC Urine 0 0 - 2 /HPF Final    RBC Urine <1 0 - 2 /HPF Final    Squamous Epithelial /HPF Urine <1 0 - 1 /HPF Final    Mucous Urine Present (A) NEG /LPF Final                Clinical Quality Measure: Blood Pressure Screening     Your blood pressure was checked while you were in the emergency department today. The last reading we obtained was  BP: 119/84 . Please read the guidelines below about what these numbers mean and what you should do about them.  If your systolic blood pressure (the top number) is less than 120 and your diastolic blood pressure (the bottom number) is less than 80, then your blood pressure is normal. There is nothing more that you need to do about it.  If your systolic blood pressure (the top number) is 120-139 or your diastolic blood pressure (the bottom number) is 80-89, your blood pressure may be higher than it should be. You should have your blood pressure rechecked within a year by a primary care provider.  If your systolic blood pressure (the top number) is 140 or greater or your diastolic blood pressure (the bottom number) is 90 or greater, you may have high blood pressure. High blood pressure is treatable, but if left untreated over time it can put you at risk for heart attack, stroke, or kidney failure. You should have your blood pressure rechecked by a primary care provider within the next 4 weeks.  If your provider in the emergency department today gave you specific instructions to follow-up with your doctor or provider even sooner than that, you should follow that instruction and not wait for up to 4 weeks for your follow-up visit.        Thank you for choosing [facility]       Thank you for choosing [facility] for your care. Our goal is always to provide you with excellent care. Hearing back from our patients is one way we can continue to improve our services. Please take a few minutes to complete the written survey that you may receive in  "the mail after you visit with us. Thank you!        [email or web address] Information     [employer] lets you send messages to your doctor, view your test results, renew your prescriptions, schedule appointments and more. To sign up, go to [email or web address] . Click on \"Log in\" on the left side of the screen, which will take you to the Welcome page. Then click on \"Sign up Now\" on the right side of the page.     You will be asked to enter the access code listed below, as well as some personal information. Please follow the directions to create your username and password.     Your access code is: [email or web address]  Expires: 2017  9:29 AM     Your access code will  in 90 days. If you need help or a new code, please call your [facility] clinic or [phone].        Care EveryWhere ID     This is your Care EveryWhere ID. This could be used by other organizations to access your [facility] medical records  [id number]        After Visit Summary       This is your record. Keep this with you and show to your community pharmacist(s) and doctor(s) at your next visit.                  "

## 2017-05-13 NOTE — ED PROVIDER NOTES
History     Chief Complaint:  Abdominal Pain      HPI   Tye Morales is a 42 year old female who presents for evaluation of a week of lower abdominal pain which wraps around to her lower back with intermittent mild dysuria.  She reports she did not get her period this month.  She denies fevers, nausea, vomiting, diarrhea, and constipation.      Allergies:  NKDA     Medications:    Prilosec  Antivert  Prednisone  Ibuprofen  Iron  Calcium     Past Medical History:    Epigastric abdominal pain  Anemia  Constipation  GERD  H Pylori  Neutropenia     Past Surgical History:      GYN surgery   Colposcopy with cervical biopsy     Family History:  History reviewed.  No significant family history.     Social History:  Relationship status:   The patient denies smoking.   The patient denies alcohol use.   The patient presents with her daughter who is currently being evaluated for knee pain.      Review of Systems   Constitutional: Negative for fever.   Gastrointestinal: Positive for abdominal pain.   Genitourinary: Positive for dysuria. Negative for vaginal bleeding.   All other systems reviewed and are negative.      Physical Exam   First Vitals:  BP: 119/84  Pulse: 81  Heart Rate: 81  Temp: 98  F (36.7  C)  Resp: 18  SpO2: 99 %      Physical Exam  General: Patient is alert and interactive when I enter the room.  Appears in no apparent distress with the pain.   Head:  The scalp, face, and head appear normal  Eyes:  The pupils are equal, round, and reactive to light    Conjunctivae and sclerae are normal  ENT:    External acoustic canals are normal    The oropharynx is normal without erythema.     Uvula is in the midline  Neck:  Normal range of motion  CV:  Regular rate. S1/S2. No murmurs.   Resp:  Lungs are clear without wheezes or rales. No distress  GI (first exam):    Abdomen is soft, no rigidity.    No distension.     Very mild lower quadrant tenderness to palpation.     There is no rebound or  guarding.     Bowel sounds are normal.       No hernias or bruising are noted in detailed exam.     No CVA tenderness.   MS:  Normal tone. Joints grossly normal without effusions.     No asymmetric leg swelling, calf or thigh tenderness.      Normal motor assessment of all extremities.  Skin:  No rash or lesions noted. Normal capillary refill noted  Neuro: Speech is normal and fluent. Face is symmetric.     Moving all extremities well.   Psych:  Awake. Alert.  Normal affect.  Appropriate interactions.  Lymph: No anterior cervical lymphadenopathy noted      Emergency Department Course     Laboratory:  UA: Slightly cloudy, yellow urine; Mucous Present (A) Rest WNL  HCG Qualitative Urine: Negative      ED Course:  Nursing notes and past medical history reviewed.   I performed a physical examination of the patient as documented above.  I explained the plan with the patient who consents to this.   The patient underwent the workup as described above.   0240 Recheck and update.  I offered the patient abdominal CT and further workup but she declines at this time.     I personally reviewed the laboratory results with the Patient and answered all related questions prior to discharge.   Findings and plan explained to the Patient. Patient discharged home with instructions regarding supportive care, medications, and reasons to return. The importance of close follow-up was reviewed.     Impression & Plan      Medical Decision Making:   Tye Morales is a 42 year old female who presents with lower abdominal pain.  She looks overall well and without a concerning etiology of abdominal pain.  A broad differential diagnosis was of course considered.    The workup in the ED is at this point negative.  No etiology for the patients pain is found at this point and my suspicion of an intraabdominal catastrophe or other worrisome etiology is very low.   CT and lab work and further workup were offered but the patient declines at this  time. She understands I cannot exclude etiologies such as PID, diveriticultiis, etc as the cause of her pain.  Patient is hemodynamically stable in ED. Plan is home with abdominal pain recheck by primary care physician or return to ED at anytime.  Return for fevers greater than 102, increasing pain, other new symptoms develop.  Abdominal pain handout given.  Questions were answered.       Diagnosis:    ICD-10-CM   1. Lower abdominal pain R10.30         Disposition:   Discharge to home with primary care follow up.         Thomas REYNOLDS, am serving as a scribe on 5/13/2017 at 1:27 AM to personally document services performed by Rebel Dowell MD, based on my observations and the provider's statements to me.       Rebel Dowell MD  05/13/17 0350

## 2017-05-31 DIAGNOSIS — D50.9 IRON DEFICIENCY ANEMIA: ICD-10-CM

## 2017-06-01 RX ORDER — PRENATAL WITH FERROUS FUM AND FOLIC ACID 3080; 920; 120; 400; 22; 1.84; 3; 20; 10; 1; 12; 200; 27; 25; 2 [IU]/1; [IU]/1; MG/1; [IU]/1; MG/1; MG/1; MG/1; MG/1; MG/1; MG/1; UG/1; MG/1; MG/1; MG/1; MG/1
TABLET ORAL
Qty: 90 TABLET | Refills: 3 | Status: SHIPPED | OUTPATIENT
Start: 2017-06-01 | End: 2018-03-07

## 2017-06-01 NOTE — TELEPHONE ENCOUNTER
Prenatal Vit-Fe Fumarate-FA (PRENATAL LOW IRON) 27-1 MG TABS      Last Written Prescription Date: 2/16/17  Last Fill Quantity: 90,  # refills: prn   Last Office Visit with [facility], [facility] or [facility] prescribing provider: 12/9/16                                         Next 5 appointments (look out 90 days)     Jun 06, 2017 12:45 PM CDT   Return Visit with René Morejon MD, [employer]   [facility] MEDIC[facility] ([facility] [place])    [address] [address] [address] [phone]            Aug 15, 2017  5:30 PM CDT   [facility] Visit [facility]   [facility]    [address] [address] [phone]

## 2017-06-01 NOTE — TELEPHONE ENCOUNTER
Prenatal 27      Last Written Prescription Date: 02/10/16  Last Fill Quantity: 90,  # refills: prn   Last Office Visit with [facility], [facility] or  Health prescribing provider: 05/31/17                                         Next 5 appointments (look out 90 days)     Jun 06, 2017 12:45 PM CDT   Return Visit with René Morejon MD, [facility] [facility]   [facility]    [address] [facility], [address] [id number] [phone]            Aug 15, 2017  5:30 PM CDT   Office Visit with Rosa [facility]ung[facility]ia MD   [facility]    [address] [address] [phone]

## 2017-06-27 ENCOUNTER — OFFICE VISIT (OUTPATIENT)
Dept: OBGYN | Facility: CLINIC | Age: 42
End: 2017-06-27
Payer: COMMERCIAL

## 2017-06-27 VITALS
BODY MASS INDEX: 30.02 KG/M2 | SYSTOLIC BLOOD PRESSURE: 109 MMHG | HEART RATE: 85 BPM | WEIGHT: 186 LBS | OXYGEN SATURATION: 99 % | DIASTOLIC BLOOD PRESSURE: 80 MMHG

## 2017-06-27 DIAGNOSIS — R10.2 PELVIC PAIN IN FEMALE: Primary | ICD-10-CM

## 2017-06-27 LAB
ALBUMIN UR-MCNC: NEGATIVE MG/DL
APPEARANCE UR: CLEAR
BILIRUB UR QL STRIP: NEGATIVE
COLOR UR AUTO: YELLOW
GLUCOSE UR STRIP-MCNC: NEGATIVE MG/DL
HCG, QUAL URINE: NEGATIVE
HGB UR QL STRIP: NEGATIVE
KETONES UR STRIP-MCNC: NEGATIVE MG/DL
LEUKOCYTE ESTERASE UR QL STRIP: NEGATIVE
MICRO REPORT STATUS: NORMAL
NITRATE UR QL: NEGATIVE
PH UR STRIP: 5 PH (ref 5–7)
SP GR UR STRIP: >1.03 (ref 1–1.03)
SPECIMEN SOURCE: NORMAL
URN SPEC COLLECT METH UR: NORMAL
UROBILINOGEN UR STRIP-ACNC: 0.2 EU/DL (ref 0.2–1)
WET PREP SPEC: NORMAL

## 2017-06-27 PROCEDURE — 81003 URINALYSIS AUTO W/O SCOPE: CPT | Performed by: ADVANCED PRACTICE MIDWIFE

## 2017-06-27 PROCEDURE — 87210 SMEAR WET MOUNT SALINE/INK: CPT | Performed by: ADVANCED PRACTICE MIDWIFE

## 2017-06-27 PROCEDURE — 87086 URINE CULTURE/COLONY COUNT: CPT | Performed by: ADVANCED PRACTICE MIDWIFE

## 2017-06-27 PROCEDURE — 99213 OFFICE O/P EST LOW 20 MIN: CPT | Performed by: ADVANCED PRACTICE MIDWIFE

## 2017-06-27 PROCEDURE — 87491 CHLMYD TRACH DNA AMP PROBE: CPT | Performed by: ADVANCED PRACTICE MIDWIFE

## 2017-06-27 PROCEDURE — 84703 CHORIONIC GONADOTROPIN ASSAY: CPT | Performed by: ADVANCED PRACTICE MIDWIFE

## 2017-06-27 PROCEDURE — 87591 N.GONORRHOEAE DNA AMP PROB: CPT | Performed by: ADVANCED PRACTICE MIDWIFE

## 2017-06-27 NOTE — PROGRESS NOTES
SUBJECTIVE:                                                   Tye Morales is a 42 year old who presents to clinic today for the following health issue(s):  Patient presents with:  Vaginal Problem: discharge   Abdominal Pain: lower     Patient is here today with an .    HPI:  Tye presents today with reports of two weeks of increased white vaginal discharge, with intermittent odor and pelvic pain that wraps around to her back. She states that the pain is worse just prior and just after urination.     Patient's last menstrual period was 2017 (exact date).  Menstrual History: frequency: every 28-30 days  Patient is sexually active, last sexual contact was over a month ago  .  Using nothing for contraception at this time.   Health maintenance updated:  yes  STI infx testing offered:  Accepted    Last PHQ-9 score on record =   PHQ-9 SCORE 2016   Total Score -   Total Score 1     Last GAD7 score on record =   ANA MARÍA-7 SCORE 2016   Total Score 0     Alcohol Score = 0    Problem list and histories reviewed & adjusted, as indicated.  Additional history: as documented.    Patient Active Problem List   Diagnosis     Constipation     External hemorrhoids     Helicobacter pylori infection     Vitamin D deficiency     CARDIOVASCULAR SCREENING; LDL GOAL LESS THAN 160     Anemia     LSIL (low grade squamous intraepithelial lesion) on Pap smear     Iron deficiency anemia     Encounter for long-term (current) use of medications     Vitamin B12 deficiency without anemia     Weight gain     GERD (gastroesophageal reflux disease)     History of  delivery, currently pregnant--plans TOLAC     Status post repeat low transverse  section     Fatigue     Muscle cramps     Vitamin B6 deficiency     Vitamin B12 deficiency (non anemic)     Iron deficiency     Fatigue, unspecified type     Allergic rhinitis, unspecified allergic rhinitis type     Past Surgical History:   Procedure  Laterality Date      SECTION  2013    Procedure:  SECTION;  primary  section, failure to progress;  Surgeon: Nathan Salinas MD;  Location: [facility]      SECTION N/A 2015    Procedure:  SECTION;  Surgeon: Tanya Levy MD;  Location: [facility]     GYN SURGERY        Social History   Substance Use Topics     Smoking status: Never Smoker     Smokeless tobacco: Never Used     Alcohol use No      Problem (# of Occurrences) Relation (Name,Age of Onset)    Family History Negative (2) Mother, Father            Current Outpatient Prescriptions   Medication Sig     PRENATAL 27-1 MG TABS TAKE 1 TABLET BY MOUTH ONCE DAILY     omeprazole (PRILOSEC) 20 MG CR capsule TAKE ONE CAPSULE BY MOUTH EVERY MORNING BEFORE BREAKFAST     cholecalciferol 5000 UNITS CAPS Take 1 capsule (5,000 Units) by mouth daily FOR VITAMIN D DEFICIENCY (LOW VITAMIN D)     meclizine (ANTIVERT) 12.5 MG tablet Take 1 tablet (12.5 mg) by mouth 4 times daily as needed for dizziness     predniSONE (DELTASONE) 20 MG tablet Take 3 tabs (60 mg) orally daily for 3 days, 2 tabs (40 mg) orally daily for 3 days, 1 tab (20 mg) orally daily for 3 days, then 1/2 tab (10 mg) orally for 3 days     pyridOXINE (VITAMIN  B-6) 25 MG tablet Take 1 tablet (25 mg) by mouth daily INDICATION: VITAMIN B6 DEFICIENCY     ibuprofen (ADVIL,MOTRIN) 800 MG tablet Take 1 tablet (800 mg) by mouth every 8 hours as needed for moderate pain     ferrous sulfate (IRON) 325 (65 FE) MG tablet Take 1 tablet (325 mg) by mouth daily (with breakfast) IRON SUPPLEMENT, TAKE WITH 4 OZ OF APPLE JUICE     Calcium Carb-Cholecalciferol (CALCIUM 1000 + D) 1000-800 MG-UNIT TABS Take 1 tablet by mouth daily TAKE WITH FOOD, FOR BONE HEALTH AND LOW VITAMIN D ([facility])     cyclobenzaprine (FLEXERIL) 5 MG tablet Take 1 tablet (5 mg) by mouth nightly as needed for muscle spasms     ibuprofen (ADVIL,MOTRIN) 600 MG tablet Take 1 tablet (600  mg) by mouth every 8 hours as needed for moderate pain MUST TAKE WITH FOOD     Cyanocobalamin (B-12 SUPER STRENGTH) 5000 MCG/ML LIQD Place 1 mL under the tongue daily FOR VITAMIN B12 SUPPLEMENTATION, PLEASE PLACE UNDER THE TONGUE     fluticasone (FLONASE) 50 MCG/ACT nasal spray Spray 2 sprays in nostril At Bedtime INDICATION: TO CONTROL NASAL ALLERGY SYMPTOMS, DO NOT BLOW NOSE FOR 30 MINUTES AFTER USING     No current facility-administered medications for this visit.      Allergies   Allergen Reactions     Nkda [No Known Drug Allergies]        ROS:  C: NEGATIVE for fever, chills, change in weight  GI: NEGATIVE for nausea, abdominal pain, heartburn, or change in bowel habits  : NEGATIVE  Periods are regular.         POSITIVE for 2 week history of white vaginal discharge with intermittent odor and low pelvic pain that is worse just prior and after urination.  M: NEGATIVE for significant arthralgias or myalgia  N: NEGATIVE for weakness, dizziness or paresthesias  E: NEGATIVE for temperature intolerance, skin/hair changes  H: NEGATIVE for bleeding problems  P: NEGATIVE for changes in mood or affect    OBJECTIVE:     /80  Pulse 85  Wt 186 lb (84.4 kg)  LMP 06/11/2017 (Exact Date)  SpO2 99%  BMI 30.02 kg/m2  Body mass index is 30.02 kg/(m^2).    PHYSICAL EXAM:  Constitutional:  Appearance: Well nourished, well developed alert, in no acute distress  Chest:  Respiratory Effort:  Breathing unlabored  Gastrointestinal:  Abdominal Examination:  Abdomen nontender to palpation, tone normal without rigidity or guarding, Hernias:  No hernias present  Neurologic/Psychiatric:  Mental Status:  Oriented X3   Pelvic Exam:  External Genitalia:     Normal appearance for age, no discharge present, no tenderness present, no inflammatory lesions present, color normal  Vagina:     Normal vaginal vault without central or paravaginal defects, no discharge present, no inflammatory lesions present, no masses present, swabs collected  for wet prep and GC/chlam  Bladder:     Nontender to palpation  Urethra:   Urethral Body:  Urethra palpation normal, urethra structural support normal   Urethral Meatus:  No erythema or lesions present  Cervix:     no lesions present, nontender to palpation, no bleeding present  Uterus:     Uterus: firm, and nontender, anteverted in position. Larger than expected 12-14 week size  Adnexa:     No adnexal tenderness present, no adnexal masses present  Perineum:     Perineum within normal limits, no evidence of trauma, no rashes or skin lesions present  Anus:     Anus within normal limits, no hemorrhoids present  Inguinal Lymph Nodes:     No lymphadenopathy present  Pubic Hair:     Normal pubic hair distribution for age  Genitalia and Groin:     No rashes present, no lesions present, no areas of discoloration, no masses present     In-Clinic Test Results:  No results found for this or any previous visit (from the past 24 hour(s)).    ASSESSMENT/PLAN:                                                        ICD-10-CM    1. Pelvic pain in female R10.2 Neisseria gonorrhoeae PCR     Chlamydia trachomatis PCR     *UA reflex to Microscopic     Urine Culture Aerobic Bacterial     Wet prep     HCL HCG, URINE, NURSE BACKOFFICE     US Pelvic Complete w Transvaginal       PLAN:    Exam normal with exception of 12-14 week sized uterus, but could be result of mult pregnancies. Urine HCG negative, Wet prep normal. UA normal. GC/Chlam pending. Patient referred for ultrasound regarding persistent pelvic pain. Patient will return to clinic to discuss results.    ALF Henderson, CNM

## 2017-06-27 NOTE — MR AVS SNAPSHOT
After Visit Summary   6/27/2017    Tye Morales    MRN: [id number]           Patient Information     Date Of Birth          1975        Visit Information        Provider Department      6/27/2017 3:00 PM Brook Guajardo CNM; [facility] [employer] [facility]        Today's Diagnoses     Pelvic pain in female    -  1       Follow-ups after your visit        Follow-up notes from your care team     Return if symptoms worsen or fail to improve.      Your next 10 appointments already scheduled     Jun 29, 2017  9:45 AM CDT   US PELVIC COMPLETE W TRANSVAGINAL with OXUS1   [facility] ([facility])    [address] [address] [phone]           Please bring a list of your medicines (including vitamins, minerals and over-the-counter drugs). Also, tell your doctor about any allergies you may have. Wear comfortable clothes and leave your valuables at home.  Adults: Drink six 8-ounce glasses of fluid one hour before your exam. Do NOT empty your bladder.  If you need to empty your bladder before your exam, try to release only a little bit of urine. Then, drink another 8oz glass of fluid.  Children: Children who are potty trained should drink at least 4 cups (32 oz) of liquid 45 minutes to one hour prior to the exam. The child s bladder must be full in order to achieve a diagnostic exam. If your child is very uncomfortable or has an urgent need to pee, please notify a technologist; they will try to find out how much longer the wait may be and provide instructions to help relieve the pressure. Occasionally it is medically necessary to insert a urinary catheter to fill the bladder.  Please call the Imaging Department at your exam site with any questions.            Jul 03, 2017 11:00 AM CDT   SHORT with Brook Guajardo CNM   [facility] ([facility])     [address] [address]   [phone]            Jul 11, 2017  2:00 PM CDT   New Visit with Zoey Trotter PA-C   [facility]    [address] [place] [address]   [phone]            Aug 07, 2017 11:00 AM CDT   LAB with [facility]   [facility]    [address] [place] [address]   [phone]           Patient must bring picture ID.  Patient should be prepared to give a urine specimen  Please do not eat 10-12 hours before your appointment if you are coming in fasting for labs on lipids, cholesterol, or glucose (sugar).  Pregnant women should follow their Care Team instructions. Water with medications is okay. Do not drink coffee or other fluids.   If you have concerns about taking  your medications, please ask at office or if scheduling via [employer], send a message by clicking on Secure Messaging, Message Your Care Team.            Aug 15, 2017  5:30 PM CDT   Office Visit with Rosa Munguia MD   [facility]    [address] [place] [address]   [phone]           Bring a current list of meds and any records pertaining to this visit.  For Physicals, please bring immunization records and any forms needing to be filled out.  Please arrive 10 minutes early to complete paperwork.              Future tests that were ordered for you today     Open Future Orders        Priority Expected Expires Ordered    US Pelvic Complete w Transvaginal Routine  6/27/2018 6/27/2017            Who to contact     If you have questions or need follow up information about today's clinic visit or your schedule please contact [facility] directly at [phone].  Normal or non-critical lab and imaging results will be communicated to you by [facility],  "letter or phone within 4 business days after the clinic has received the results. If you do not hear from us within 7 days, please contact the clinic through [facility] or phone. If you have a critical or abnormal lab result, we will notify you by phone as soon as possible.  Submit refill requests through [facility] or call your pharmacy and they will forward the refill request to us. Please allow 3 business days for your refill to be completed.          Additional Information About Your Visit        [facility] Information     [facility] lets you send messages to your doctor, view your test results, renew your prescriptions, schedule appointments and more. To sign up, go to [email or web address] . Click on \"Log in\" on the left side of the screen, which will take you to the Welcome page. Then click on \"Sign up Now\" on the right side of the page.     You will be asked to enter the access code listed below, as well as some personal information. Please follow the directions to create your username and password.     Your access code is: [email or web address]  Expires: 2017  4:15 PM     Your access code will  in 90 days. If you need help or a new code, please call your [place] clinic or [phone].        Care EveryWhere ID     This is your Care EveryWhere ID. This could be used by other organizations to access your [facility] medical records  [id number]        Your Vitals Were     Pulse Last Period Pulse Oximetry BMI (Body Mass Index)          85 2017 (Exact Date) 99% 30.02 kg/m2         Blood Pressure from Last 3 Encounters:   17 109/80   17 119/84   17 121/76    Weight from Last 3 Encounters:   17 186 lb (84.4 kg)   17 187 lb (84.8 kg)   17 187 lb (84.8 kg)              We Performed the Following     *UA reflex to Microscopic     Chlamydia trachomatis PCR     HCL HCG, URINE, NURSE BACKOFFICE     Neisseria gonorrhoeae PCR     Urine Culture Aerobic Bacterial     Wet prep        " Primary Care Provider Office Phone # Fax #    Rosa Munguia [phone]       [facility] [address] [facility] [address]        Equal Access to Services     JAKOB GOMEZ : Hadii aad ku hadboboangela Dony, waaxda luqadaha, qaybta kaalmada adejohnna, ilya dedein hayaaamy contreras rubenmarquise morales. So [facility] [phone].    ATENCIÓN: Si habla español, tiene a santiago disposición servicios gratuitos de asistencia lingüística. Llame al [phone].    We comply with applicable federal civil rights laws and [place] laws. We do not discriminate on the basis of race, color, national origin, age, disability sex, sexual orientation or gender identity.            Thank you!     Thank you for choosing [facility]  for your care. Our goal is always to provide you with excellent care. Hearing back from our patients is one way we can continue to improve our services. Please take a few minutes to complete the written survey that you may receive in the mail after your visit with us. Thank you!             Your Updated Medication List - Protect others around you: Learn how to safely use, store and throw away your medicines at www.disposemymeds.org.          This list is accurate as of: 6/27/17  4:15 PM.  Always use your most recent med list.                   Brand Name Dispense Instructions for use Diagnosis    Calcium Carb-Cholecalciferol 1000-800 MG-UNIT Tabs    CALCIUM 1000 + D    100 tablet    Take 1 tablet by mouth daily TAKE WITH FOOD, FOR BONE HEALTH AND LOW VITAMIN D ([facility])    Vitamin D deficiency       cholecalciferol 5000 UNITS Caps     100 capsule    Take 1 capsule (5,000 Units) by mouth daily FOR VITAMIN D DEFICIENCY (LOW VITAMIN D)    Vitamin D deficiency       Cyanocobalamin 5000 MCG/ML Liqd    B-12 SUPER STRENGTH    2 Bottle    Place 1 mL under the tongue daily FOR VITAMIN B12 SUPPLEMENTATION, PLEASE PLACE UNDER THE TONGUE    Vitamin B12  deficiency (non anemic)       cyclobenzaprine 5 MG tablet    FLEXERIL    42 tablet    Take 1 tablet (5 mg) by mouth nightly as needed for muscle spasms    Chest wall pain, Pain of left upper arm       ferrous sulfate 325 (65 FE) MG tablet    IRON    90 tablet    Take 1 tablet (325 mg) by mouth daily (with breakfast) IRON SUPPLEMENT, TAKE WITH 4 OZ OF APPLE JUICE    Iron deficiency       fluticasone 50 MCG/ACT spray    FLONASE    16 g    Spray 2 sprays in nostril At Bedtime INDICATION: TO CONTROL NASAL ALLERGY SYMPTOMS, DO NOT BLOW NOSE FOR 30 MINUTES AFTER USING    Allergic rhinitis, unspecified allergic rhinitis type       * ibuprofen 600 MG tablet    ADVIL/MOTRIN    90 tablet    Take 1 tablet (600 mg) by mouth every 8 hours as needed for moderate pain MUST TAKE WITH FOOD    Chest wall pain, Pain of left upper arm       * ibuprofen 800 MG tablet    ADVIL/MOTRIN    60 tablet    Take 1 tablet (800 mg) by mouth every 8 hours as needed for moderate pain    Pelvic pain in female       meclizine 12.5 MG tablet    ANTIVERT    30 tablet    Take 1 tablet (12.5 mg) by mouth 4 times daily as needed for dizziness        omeprazole 20 MG CR capsule    priLOSEC    90 capsule    TAKE ONE CAPSULE BY MOUTH EVERY MORNING BEFORE BREAKFAST    Gastroesophageal reflux disease without esophagitis       predniSONE 20 MG tablet    DELTASONE    20 tablet    Take 3 tabs (60 mg) orally daily for 3 days, 2 tabs (40 mg) orally daily for 3 days, 1 tab (20 mg) orally daily for 3 days, then 1/2 tab (10 mg) orally for 3 days    Multiple joint pain, Vitamin B6 deficiency       PRENATAL 27-1 MG Tabs     90 tablet    TAKE 1 TABLET BY MOUTH ONCE DAILY    Iron deficiency anemia       pyridOXINE 25 MG tablet    vitamin B-6    30 tablet    Take 1 tablet (25 mg) by mouth daily INDICATION: VITAMIN B6 DEFICIENCY    Vitamin B6 deficiency, Multiple joint pain       * Notice:  This list has 2 medication(s) that are the same as other medications prescribed for  you. Read the directions carefully, and ask your doctor or other care provider to review them with you.

## 2017-06-27 NOTE — NURSING NOTE
"Chief Complaint   Patient presents with     Vaginal Problem     discharge      Abdominal Pain     lower        Initial /80  Pulse 85  Wt 186 lb (84.4 kg)  LMP 2017 (Exact Date)  SpO2 99%  BMI 30.02 kg/m2 Estimated body mass index is 30.02 kg/(m^2) as calculated from the following:    Height as of 16: 5' 6\" (1.676 m).    Weight as of this encounter: 186 lb (84.4 kg).  BP completed using cuff size: regular        The following HM Due: NONE      The following patient reported/Care Every where data was sent to:  P ABSTRACT QUALITY INITIATIVES [35149]       Lower abdominal pain before and after urination   Discharge mucus like    Tiff Weems, [facility]                "

## 2017-06-29 LAB
BACTERIA SPEC CULT: NORMAL
C TRACH DNA SPEC QL NAA+PROBE: NORMAL
MICRO REPORT STATUS: NORMAL
N GONORRHOEA DNA SPEC QL NAA+PROBE: NORMAL
SPECIMEN SOURCE: NORMAL

## 2017-06-30 ENCOUNTER — RADIANT APPOINTMENT (OUTPATIENT)
Dept: ULTRASOUND IMAGING | Facility: CLINIC | Age: 42
End: 2017-06-30
Attending: ADVANCED PRACTICE MIDWIFE
Payer: COMMERCIAL

## 2017-06-30 DIAGNOSIS — R10.2 PELVIC PAIN IN FEMALE: ICD-10-CM

## 2017-06-30 PROCEDURE — 76830 TRANSVAGINAL US NON-OB: CPT | Performed by: OBSTETRICS & GYNECOLOGY

## 2017-06-30 PROCEDURE — 76856 US EXAM PELVIC COMPLETE: CPT | Performed by: OBSTETRICS & GYNECOLOGY

## 2017-07-03 ENCOUNTER — OFFICE VISIT (OUTPATIENT)
Dept: OBGYN | Facility: CLINIC | Age: 42
End: 2017-07-03
Payer: COMMERCIAL

## 2017-07-03 VITALS
HEART RATE: 78 BPM | HEIGHT: 66 IN | SYSTOLIC BLOOD PRESSURE: 112 MMHG | WEIGHT: 183 LBS | OXYGEN SATURATION: 99 % | BODY MASS INDEX: 29.41 KG/M2 | DIASTOLIC BLOOD PRESSURE: 68 MMHG

## 2017-07-03 DIAGNOSIS — E61.1 IRON DEFICIENCY: ICD-10-CM

## 2017-07-03 DIAGNOSIS — E53.8 VITAMIN B12 DEFICIENCY (NON ANEMIC): ICD-10-CM

## 2017-07-03 DIAGNOSIS — R10.84 ABDOMINAL PAIN, GENERALIZED: Primary | ICD-10-CM

## 2017-07-03 DIAGNOSIS — R10.2 PELVIC PAIN IN FEMALE: ICD-10-CM

## 2017-07-03 DIAGNOSIS — E55.9 VITAMIN D DEFICIENCY: ICD-10-CM

## 2017-07-03 PROCEDURE — 99213 OFFICE O/P EST LOW 20 MIN: CPT | Performed by: ADVANCED PRACTICE MIDWIFE

## 2017-07-03 RX ORDER — FERROUS SULFATE 325(65) MG
1 TABLET ORAL
Qty: 90 TABLET | Refills: 2 | OUTPATIENT
Start: 2017-07-03

## 2017-07-03 NOTE — TELEPHONE ENCOUNTER
Prenatal filled 6/1/17 w/ 3 refills      cholecalciferol 5000 UNITS CAPS      Last Written Prescription Date: 10/27/16  Last Fill Quantity:100,  # refills: 3   Last Office Visit with [facility], [facility] or [facility] prescribing provider: 10/27/16                                         Next 5 appointments (look out 90 days)     Aug 15, 2017  5:30 PM CDT   Office Visit with Rosa Munguia MD   [facility]    [address] [phone]                    ferrous sulfate (IRON) 325 (65 FE) MG tablet      Last Written Prescription Date: 10/27/2016  Last Fill Quantity: 90,  # refills: 2   Last Office Visit with [facility], [facility] or [facility] prescribing provider: 10/27/2016        Calcium Carb-Cholecalciferol (CALCIUM 1000 + D) 1000-800 MG-UNIT TABS      Last Written Prescription Date: 10/27/2016  Last Fill Quantity: 100,  # refills: PRN   Last Office Visit with [facility], [facility] or [facility] prescribing provider: 10/27/2016        Cyanocobalamin (B-12 SUPER STRENGTH) 5000 MCG/ML LIQD      Last Written Prescription Date: 8/15/2016  Last Fill Quantity: 2 bottles,  # refills: PRN   Last Office Visit with [facility], [facility] or [facility] prescribing provider: 10/27/2016                                         Next 5 appointments (look out 90 days)     Aug 15, 2017  5:30 PM CDT   Office Visit with Rosa Munguia MD   [facility]    [address] [phone]

## 2017-07-03 NOTE — PATIENT INSTRUCTIONS
Please schedule an appointment with your primary provider for further evaluation of your abdominal/pelvic pain.

## 2017-07-03 NOTE — MR AVS SNAPSHOT
After Visit Summary   7/3/2017    Tye Morales    MRN: [id number]           Patient Information     Date Of Birth          1975        Visit Information        Provider Department      7/3/2017 10:45 AM Brook Guajardo CNM; [employer] [facility]        Today's Diagnoses     Abdominal pain, generalized    -  1    Pelvic pain in female          Care Instructions    Please schedule an appointment with your primary provider for further evaluation of your abdominal/pelvic pain.          Follow-ups after your visit        Follow-up notes from your care team     Return if symptoms worsen or fail to improve.      Your next 10 appointments already scheduled     Jul 11, 2017  2:00 PM CDT   New Visit with Zoey Trotter PA-C   [facility]    [address] [place] [id number] [phone]            Aug 07, 2017 11:00 AM CDT   LAB with [facility]   [facility]    [address] [place] [address] [id number] [phone]           Patient must bring picture ID.  Patient should be prepared to give a urine specimen  Please do not eat 10-12 hours before your appointment if you are coming in fasting for labs on lipids, cholesterol, or glucose (sugar).  Pregnant women should follow their Care Team instructions. Water with medications is okay. Do not drink coffee or other fluids.   If you have concerns about taking  your medications, please ask at office or if scheduling via [employer], send a message by clicking on Secure Messaging, Message Your Care Team.            Aug 15, 2017  5:30 PM CDT   Office Visit with Rosa Munguia MD   [facility]    [address] [place] [id number] [phone]           Bring a current list of meds and any  "records pertaining to this visit.  For Physicals, please bring immunization records and any forms needing to be filled out.  Please arrive 10 minutes early to complete paperwork.              Who to contact     If you have questions or need follow up information about today's clinic visit or your schedule please contact [facility] directly at [phone].  Normal or non-critical lab and imaging results will be communicated to you by MyChart, letter or phone within 4 business days after the clinic has received the results. If you do not hear from us within 7 days, please contact the clinic through [employer] or phone. If you have a critical or abnormal lab result, we will notify you by phone as soon as possible.  Submit refill requests through [employer] or call your pharmacy and they will forward the refill request to us. Please allow 3 business days for your refill to be completed.          Additional Information About Your Visit        [employer] Information     [employer] lets you send messages to your doctor, view your test results, renew your prescriptions, schedule appointments and more. To sign up, go to [email or web address] . Click on \"Log in\" on the left side of the screen, which will take you to the Welcome page. Then click on \"Sign up Now\" on the right side of the page.     You will be asked to enter the access code listed below, as well as some personal information. Please follow the directions to create your username and password.     Your access code is: [email or web address]  Expires: 2017  4:15 PM     Your access code will  in 90 days. If you need help or a new code, please call your [facility] clinic or [phone].        Care EveryWhere ID     This is your Care EveryWhere ID. This could be used by other organizations to access your [facility] medical records  [id number]        Your Vitals Were     Pulse Height Last Period Pulse Oximetry BMI (Body Mass Index)       78 5' 6\" (1.676 " m) 06/11/2017 (Exact Date) 99% 29.54 kg/m2        Blood Pressure from Last 3 Encounters:   07/03/17 112/68   06/27/17 109/80   05/12/17 119/84    Weight from Last 3 Encounters:   07/03/17 183 lb (83 kg)   06/27/17 186 lb (84.4 kg)   02/14/17 187 lb (84.8 kg)              Today, you had the following     No orders found for display       Primary Care Provider Office Phone # Fax #    Rosa Munguia [phone]       [facility] [address] [facility] [address]        Equal Access to Services     JAKOB GOMEZ : Clare Napoles, ainsley britton, cecilia brian, ilya morales. So [facility] [phone].    ATENCIÓN: Si habla español, tiene a santiago disposición servicios gratuitos de asistencia lingüística. Llame al [phone].    We comply with applicable federal civil rights laws and [place] laws. We do not discriminate on the basis of race, color, national origin, age, disability sex, sexual orientation or gender identity.            Thank you!     Thank you for choosing [facility]  for your care. Our goal is always to provide you with excellent care. Hearing back from our patients is one way we can continue to improve our services. Please take a few minutes to complete the written survey that you may receive in the mail after your visit with us. Thank you!             Your Updated Medication List - Protect others around you: Learn how to safely use, store and throw away your medicines at www.disposemymeds.org.          This list is accurate as of: 7/3/17 11:29 AM.  Always use your most recent med list.                   Brand Name Dispense Instructions for use Diagnosis    Calcium Carb-Cholecalciferol 1000-800 MG-UNIT Tabs    CALCIUM 1000 + D    100 tablet    Take 1 tablet by mouth daily TAKE WITH FOOD, FOR BONE HEALTH AND LOW VITAMIN D (SAMIR FUENTES)    Vitamin D deficiency        cholecalciferol 5000 UNITS Caps     100 capsule    Take 1 capsule (5,000 Units) by mouth daily FOR VITAMIN D DEFICIENCY (LOW VITAMIN D)    Vitamin D deficiency       Cyanocobalamin 5000 MCG/ML Liqd    B-12 SUPER STRENGTH    2 Bottle    Place 1 mL under the tongue daily FOR VITAMIN B12 SUPPLEMENTATION, PLEASE PLACE UNDER THE TONGUE    Vitamin B12 deficiency (non anemic)       cyclobenzaprine 5 MG tablet    FLEXERIL    42 tablet    Take 1 tablet (5 mg) by mouth nightly as needed for muscle spasms    Chest wall pain, Pain of left upper arm       ferrous sulfate 325 (65 FE) MG tablet    IRON    90 tablet    Take 1 tablet (325 mg) by mouth daily (with breakfast) IRON SUPPLEMENT, TAKE WITH 4 OZ OF APPLE JUICE    Iron deficiency       fluticasone 50 MCG/ACT spray    FLONASE    16 g    Spray 2 sprays in nostril At Bedtime INDICATION: TO CONTROL NASAL ALLERGY SYMPTOMS, DO NOT BLOW NOSE FOR 30 MINUTES AFTER USING    Allergic rhinitis, unspecified allergic rhinitis type       * ibuprofen 600 MG tablet    ADVIL/MOTRIN    90 tablet    Take 1 tablet (600 mg) by mouth every 8 hours as needed for moderate pain MUST TAKE WITH FOOD    Chest wall pain, Pain of left upper arm       * ibuprofen 800 MG tablet    ADVIL/MOTRIN    60 tablet    Take 1 tablet (800 mg) by mouth every 8 hours as needed for moderate pain    Pelvic pain in female       meclizine 12.5 MG tablet    ANTIVERT    30 tablet    Take 1 tablet (12.5 mg) by mouth 4 times daily as needed for dizziness        omeprazole 20 MG CR capsule    priLOSEC    90 capsule    TAKE ONE CAPSULE BY MOUTH EVERY MORNING BEFORE BREAKFAST    Gastroesophageal reflux disease without esophagitis       predniSONE 20 MG tablet    DELTASONE    20 tablet    Take 3 tabs (60 mg) orally daily for 3 days, 2 tabs (40 mg) orally daily for 3 days, 1 tab (20 mg) orally daily for 3 days, then 1/2 tab (10 mg) orally for 3 days    Multiple joint pain, Vitamin B6 deficiency       PRENATAL 27-1 MG Tabs     90  tablet    TAKE 1 TABLET BY MOUTH ONCE DAILY    Iron deficiency anemia       pyridOXINE 25 MG tablet    vitamin B-6    30 tablet    Take 1 tablet (25 mg) by mouth daily INDICATION: VITAMIN B6 DEFICIENCY    Vitamin B6 deficiency, Multiple joint pain       * Notice:  This list has 2 medication(s) that are the same as other medications prescribed for you. Read the directions carefully, and ask your doctor or other care provider to review them with you.

## 2017-07-03 NOTE — TELEPHONE ENCOUNTER
Reason for Call:  Medication or medication refill    Do you use a [facility]?  Name of the pharmacy and phone number for the current request:  [facility] [address] [place] [address] - [phone]    Name of the medication requested prenatal vitamins vitamin d 3  b 12 calcuim iron supplement     Other request asap please    Can we leave a detailed message on this number? YES    Phone number patient can be reached at: Cell number on file:    Telephone Information:   Mobile [phone]       Best Time anytime     Call taken on 7/3/2017 at 11:21 AM by Elvi Aviles

## 2017-07-03 NOTE — TELEPHONE ENCOUNTER
Called [facility] pharmacy, patient has refills left on all medications.  They will get medications ready for patient to .

## 2017-07-03 NOTE — PROGRESS NOTES
SUBJECTIVE:                                                   Tye Morales is a 42 year old who presents to clinic today for the following health issue(s):  Patient presents with:  Results: US    Patient is here today with an .  HPI:    Tye presents today for discussion of results from labs and ultrasound done last week. Tests were ordered for abdominal/pelvic pain and vaginal discharge.       Last PHQ-9 score on record =   PHQ-9 SCORE 2016   Total Score -   Total Score 1     Last GAD7 score on record =   ANA MARÍA-7 SCORE 2016   Total Score 0       Problem list and histories reviewed & adjusted, as indicated.  Additional history: as documented.    Patient Active Problem List   Diagnosis     Constipation     External hemorrhoids     Helicobacter pylori infection     Vitamin D deficiency     CARDIOVASCULAR SCREENING; LDL GOAL LESS THAN 160     Anemia     LSIL (low grade squamous intraepithelial lesion) on Pap smear     Iron deficiency anemia     Encounter for long-term (current) use of medications     Vitamin B12 deficiency without anemia     Weight gain     GERD (gastroesophageal reflux disease)     History of  delivery, currently pregnant--plans TOLAC     Status post repeat low transverse  section     Fatigue     Muscle cramps     Vitamin B6 deficiency     Vitamin B12 deficiency (non anemic)     Iron deficiency     Fatigue, unspecified type     Allergic rhinitis, unspecified allergic rhinitis type     Past Surgical History:   Procedure Laterality Date      SECTION  2013    Procedure:  SECTION;  primary  section, failure to progress;  Surgeon: Nathan Salinas MD;  Location: [facility]      SECTION N/A 2015    Procedure:  SECTION;  Surgeon: Tanya Levy MD;  Location: [facility]     GYN SURGERY        Social History   Substance Use Topics     Smoking status: Never Smoker     Smokeless tobacco: Never Used     Alcohol use No       Problem (# of Occurrences) Relation (Name,Age of Onset)    Family History Negative (2) Mother, Father            Current Outpatient Prescriptions   Medication Sig     PRENATAL 27-1 MG TABS TAKE 1 TABLET BY MOUTH ONCE DAILY     omeprazole (PRILOSEC) 20 MG CR capsule TAKE ONE CAPSULE BY MOUTH EVERY MORNING BEFORE BREAKFAST     meclizine (ANTIVERT) 12.5 MG tablet Take 1 tablet (12.5 mg) by mouth 4 times daily as needed for dizziness     predniSONE (DELTASONE) 20 MG tablet Take 3 tabs (60 mg) orally daily for 3 days, 2 tabs (40 mg) orally daily for 3 days, 1 tab (20 mg) orally daily for 3 days, then 1/2 tab (10 mg) orally for 3 days     pyridOXINE (VITAMIN  B-6) 25 MG tablet Take 1 tablet (25 mg) by mouth daily INDICATION: VITAMIN B6 DEFICIENCY     ibuprofen (ADVIL,MOTRIN) 800 MG tablet Take 1 tablet (800 mg) by mouth every 8 hours as needed for moderate pain     cholecalciferol 5000 UNITS CAPS Take 1 capsule (5,000 Units) by mouth daily FOR VITAMIN D DEFICIENCY (LOW VITAMIN D)     ferrous sulfate (IRON) 325 (65 FE) MG tablet Take 1 tablet (325 mg) by mouth daily (with breakfast) IRON SUPPLEMENT, TAKE WITH 4 OZ OF APPLE JUICE     Calcium Carb-Cholecalciferol (CALCIUM 1000 + D) 1000-800 MG-UNIT TABS Take 1 tablet by mouth daily TAKE WITH FOOD, FOR BONE HEALTH AND LOW VITAMIN D ([facility])     cyclobenzaprine (FLEXERIL) 5 MG tablet Take 1 tablet (5 mg) by mouth nightly as needed for muscle spasms     ibuprofen (ADVIL,MOTRIN) 600 MG tablet Take 1 tablet (600 mg) by mouth every 8 hours as needed for moderate pain MUST TAKE WITH FOOD     Cyanocobalamin (B-12 SUPER STRENGTH) 5000 MCG/ML LIQD Place 1 mL under the tongue daily FOR VITAMIN B12 SUPPLEMENTATION, PLEASE PLACE UNDER THE TONGUE     fluticasone (FLONASE) 50 MCG/ACT nasal spray Spray 2 sprays in nostril At Bedtime INDICATION: TO CONTROL NASAL ALLERGY SYMPTOMS, DO NOT BLOW NOSE FOR 30 MINUTES AFTER USING     No current facility-administered  "medications for this visit.      Allergies   Allergen Reactions     Nkda [No Known Drug Allergies]        ROS:  C: NEGATIVE for fever, chills, change in weight  GI: NEGATIVE for nausea, heartburn, or change in bowel habits        POSITIVE for abdominal pain  : NEGATIVE for unusual urinary or vaginal symptoms. Periods are regular.        POSITIVE for pelvic pain and history of vaginal discharge.  M: NEGATIVE for significant arthralgias or myalgia  N: NEGATIVE for weakness, dizziness or paresthesias  E: NEGATIVE for temperature intolerance, skin/hair changes  H: NEGATIVE for bleeding problems  P: NEGATIVE for changes in mood or affect    OBJECTIVE:     /68  Pulse 78  Ht 5' 6\" (1.676 m)  Wt 183 lb (83 kg)  LMP 06/11/2017 (Exact Date)  SpO2 99%  BMI 29.54 kg/m2  Body mass index is 29.54 kg/(m^2).    PHYSICAL EXAM:  Constitutional:  Appearance: Well nourished, well developed alert, in no acute distress  Chest:  Respiratory Effort:  Breathing unlabored  Neurologic/Psychiatric:  Mental Status:  Oriented X3      In-Clinic Test Results:  No results found for this or any previous visit (from the past 24 hour(s)).    ASSESSMENT/PLAN:                                                        ICD-10-CM    1. Abdominal pain, generalized R10.84    2. Pelvic pain in female R10.2        PLAN:    Discussed all of lab results, UA, UC, wet prep, GC/Chlam, all normal. Also advised patient that her ultrasound was normal. Advised Fardowso that her pain does not seem to be gynecologic in etiology. Asked if she had discussed this pain with her primary provider. She has not seen her primary provider for this pain. Advised patient call and schedule an appointment for further evaluation with her primary provider.    15 minutes was spent face to face with the patient today discussing her history, diagnosis, and follow-up plan as noted above. Over 50% of the visit was spent in counseling and coordination of care.    Total Visit Time: " 20 minutes.     ALF Henderson, JOHNM      ALF Henderson, JOHNM

## 2017-07-11 ENCOUNTER — OFFICE VISIT (OUTPATIENT)
Dept: DERMATOLOGY | Facility: CLINIC | Age: 42
End: 2017-07-11
Payer: COMMERCIAL

## 2017-07-11 VITALS — DIASTOLIC BLOOD PRESSURE: 80 MMHG | SYSTOLIC BLOOD PRESSURE: 115 MMHG | HEART RATE: 70 BPM | OXYGEN SATURATION: 100 %

## 2017-07-11 DIAGNOSIS — L81.1 MELASMA: Primary | ICD-10-CM

## 2017-07-11 PROCEDURE — 99202 OFFICE O/P NEW SF 15 MIN: CPT | Performed by: PHYSICIAN ASSISTANT

## 2017-07-11 RX ORDER — HYDROQUINONE 40 MG/G
CREAM TOPICAL
Qty: 30 G | Refills: 5 | Status: SHIPPED | OUTPATIENT
Start: 2017-07-11 | End: 2018-02-20

## 2017-07-11 NOTE — PATIENT INSTRUCTIONS
You have melasma - this is from sunlight and hormones    Mornin. Wash with a gentle cleanser - CeraVe hydrating cleanser, Cetaphil gentle cleanser, Neutrogena Ultra Gentle cleanser  2. Apply hydroquinone to entire face - small amount  3. Sunscreen - SPF 30 or higher and re-apply to entire face every 2 hours  4. Makeup if needed    Evening  1. Wash  2. Hydroquinone again - if you are not irritated  3. Moisturizer

## 2017-07-11 NOTE — PROGRESS NOTES
HPI:   Tye Morales is a 42 year old female who presents for evaluation of dark spots on the cheeks  chief complaint  Location: bilateral cheeks    Condition present for:  years.   Previous treatments include: tretinoin, HQ - became very irritated from this and stopped using it    Review Of Systems  Eyes: negative  Ears/Nose/Throat: negative  Respiratory: No shortness of breath, dyspnea on exertion, cough, or hemoptysis  Cardiovascular: negative  Gastrointestinal: negative  Genitourinary: negative  Musculoskeletal: negative  Neurologic: negative  Psychiatric: negative        PHYSICAL EXAM:      Skin exam performed as follows: Type 5 skin. Mood appropriate  Alert and Oriented X 3. Well developed, well nourished in no distress.  General appearance: Normal  Head including face: Normal  Eyes: conjunctiva and lids: Normal  Mouth: Lips, teeth, gums: Normal  Neck: Normal  Chest-breast/axillae: Normal  Back: Normal  Spleen and liver: Normal  Cardiovascular: Exam of peripheral vascular system by observation for swelling, varicosities, edema: Normal  Genitalia: groin, buttocks: Normal  Extremities: digits/nails (clubbing): Normal  Eccrine and Apocrine glands: Normal  Right upper extremity: Normal  Left upper extremity: Normal  Right lower extremity: Normal  Left lower extremity: Normal  Skin: Scalp and body hair: See below    1. Patchy brown pigmentation across face    ASSESSMENT/PLAN:     1. Melasma on face - advised. Discussed that condition is secondary to sunlight and hormones. Advised on diligent use of SPF and topical creams. Does tend to be very sensitive.  --Start HQ cream BID x 6-12 months then PRN  --SPF daily; continue using SPF 50 or higher and reapply every 2 hours  --Gentle cleanser BID          Follow-up: 2 months/PRN sooner  CC:   Scribed By: Zoey Trotter, MS, PA-C

## 2017-07-11 NOTE — NURSING NOTE
"Initial /80  Pulse 70  LMP 06/11/2017 (Exact Date)  SpO2 100% Estimated body mass index is 29.54 kg/(m^2) as calculated from the following:    Height as of 7/3/17: 1.676 m (5' 6\").    Weight as of 7/3/17: 83 kg (183 lb). .      "

## 2017-07-11 NOTE — MR AVS SNAPSHOT
After Visit Summary   2017    Tye Morales    MRN: [id number]           Patient Information     Date Of Birth          1975        Visit Information        Provider Department      2017 1:45 PM Zoey Trotter PA-C; [employer] [facility]        Today's Diagnoses     Melasma    -  1      Care Instructions    You have melasma - this is from sunlight and hormones    Mornin. Wash with a gentle cleanser - CeraVe hydrating cleanser, Cetaphil gentle cleanser, Neutrogena Ultra Gentle cleanser  2. Apply hydroquinone to entire face - small amount  3. Sunscreen - SPF 30 or higher and re-apply to entire face every 2 hours  4. Makeup if needed    Evening  1. Wash  2. Hydroquinone again - if you are not irritated  3. Moisturizer          Follow-ups after your visit        Your next 10 appointments already scheduled     Aug 07, 2017 11:00 AM CDT   [facility]   [facility]    [address] [address] [phone]           Patient must bring picture ID.  Patient should be prepared to give a urine specimen  Please do not eat 10-12 hours before your appointment if you are coming in fasting for labs on lipids, cholesterol, or glucose (sugar).  Pregnant women should follow their Care Team instructions. Water with medications is okay. Do not drink coffee or other fluids.   If you have concerns about taking  your medications, please ask at office or if scheduling via [employer], send a message by clicking on Secure Messaging, Message Your Care Team.            Aug 15, 2017  5:30 PM CDT   [facility] with [facility]ung[facility]ia [facility]   [facility]    [address] [address] [phone]           Bring a current list of meds and any records pertaining to this visit.  For Physicals,  "please bring immunization records and any forms needing to be filled out.  Please arrive 10 minutes early to complete paperwork.              Who to contact     If you have questions or need follow up information about today's clinic visit or your schedule please contact [facility] directly at [phone].  Normal or non-critical lab and imaging results will be communicated to you by MyChart, letter or phone within 4 business days after the clinic has received the results. If you do not hear from us within 7 days, please contact the clinic through MyChart or phone. If you have a critical or abnormal lab result, we will notify you by phone as soon as possible.  Submit refill requests through [employer] or call your pharmacy and they will forward the refill request to us. Please allow 3 business days for your refill to be completed.          Additional Information About Your Visit        MyChart Information     [employer] lets you send messages to your doctor, view your test results, renew your prescriptions, schedule appointments and more. To sign up, go to [email or web address] . Click on \"Log in\" on the left side of the screen, which will take you to the Welcome page. Then click on \"Sign up Now\" on the right side of the page.     You will be asked to enter the access code listed below, as well as some personal information. Please follow the directions to create your username and password.     Your access code is: [email or web address]  Expires: 2017  4:15 PM     Your access code will  in 90 days. If you need help or a new code, please call your [facility] clinic or [phone].        Care EveryWhere ID     This is your Care EveryWhere ID. This could be used by other organizations to access your [facility] medical records  [id number]        Your Vitals Were     Pulse Last Period Pulse Oximetry             70 2017 (Exact Date) 100%          Blood Pressure from Last 3 Encounters: "   07/11/17 115/80   07/03/17 112/68   06/27/17 109/80    Weight from Last 3 Encounters:   07/03/17 83 kg (183 lb)   06/27/17 84.4 kg (186 lb)   02/14/17 84.8 kg (187 lb)              Today, you had the following     No orders found for display         Today's Medication Changes          These changes are accurate as of: 7/11/17  2:38 PM.  If you have any questions, ask your nurse or doctor.               Start taking these medicines.        Dose/Directions    hydroquinone 4 % Crea   Used for:  Melasma   Started by:  Zoey Trotter PA-C        Apply to face BID x 6-12 months then PRN only   Quantity:  30 g   Refills:  5            Where to get your medicines      Some of these will need a paper prescription and others can be bought over the counter.  Ask your nurse if you have questions.     Bring a paper prescription for each of these medications     hydroquinone 4 % Crea                Primary Care Provider Office Phone # Fax #    Rosa Munguia [phone]       [facility] [address] [place] [id number]        Equal Access to Services     JAKOB GOMEZ AH: Hadii lizzie michaelo Sojohanna, waaxda luqadaha, qaybta kaalmada adeamyyada, ilya wright . So [facility] [phone].    ATENCIÓN: Si habla español, tiene a santiago disposición servicios gratuitos de asistencia lingüística. Llame al [phone].    We comply with applicable federal civil rights laws and [place] laws. We do not discriminate on the basis of race, color, national origin, age, disability sex, sexual orientation or gender identity.            Thank you!     Thank you for choosing [facility]  for your care. Our goal is always to provide you with excellent care. Hearing back from our patients is one way we can continue to improve our services. Please take a few minutes to complete the written survey that you may receive in the mail after your visit with us. Thank you!              Your Updated Medication List - Protect others around you: Learn how to safely use, store and throw away your medicines at www.disposemymeds.org.          This list is accurate as of: 7/11/17  2:38 PM.  Always use your most recent med list.                   Brand Name Dispense Instructions for use Diagnosis    Calcium Carb-Cholecalciferol 1000-800 MG-UNIT Tabs    CALCIUM 1000 + D    100 tablet    Take 1 tablet by mouth daily TAKE WITH FOOD, FOR BONE HEALTH AND LOW VITAMIN D ([facility]    Vitamin D deficiency       cholecalciferol 5000 UNITS Caps     100 capsule    Take 1 capsule (5,000 Units) by mouth daily FOR VITAMIN D DEFICIENCY (LOW VITAMIN D)    Vitamin D deficiency       Cyanocobalamin 5000 MCG/ML Liqd    B-12 SUPER STRENGTH    2 Bottle    Place 1 mL under the tongue daily FOR VITAMIN B12 SUPPLEMENTATION, PLEASE PLACE UNDER THE TONGUE    Vitamin B12 deficiency (non anemic)       cyclobenzaprine 5 MG tablet    FLEXERIL    42 tablet    Take 1 tablet (5 mg) by mouth nightly as needed for muscle spasms    Chest wall pain, Pain of left upper arm       ferrous sulfate 325 (65 FE) MG tablet    IRON    90 tablet    Take 1 tablet (325 mg) by mouth daily (with breakfast) IRON SUPPLEMENT, TAKE WITH 4 OZ OF APPLE JUICE    Iron deficiency       fluticasone 50 MCG/ACT spray    FLONASE    16 g    Spray 2 sprays in nostril At Bedtime INDICATION: TO CONTROL NASAL ALLERGY SYMPTOMS, DO NOT BLOW NOSE FOR 30 MINUTES AFTER USING    Allergic rhinitis, unspecified allergic rhinitis type       hydroquinone 4 % Crea     30 g    Apply to face BID x 6-12 months then PRN only    Melasma       * ibuprofen 600 MG tablet    ADVIL/MOTRIN    90 tablet    Take 1 tablet (600 mg) by mouth every 8 hours as needed for moderate pain MUST TAKE WITH FOOD    Chest wall pain, Pain of left upper arm       * ibuprofen 800 MG tablet    ADVIL/MOTRIN    60 tablet    Take 1 tablet (800 mg) by mouth every 8 hours as needed for  moderate pain    Pelvic pain in female       meclizine 12.5 MG tablet    ANTIVERT    30 tablet    Take 1 tablet (12.5 mg) by mouth 4 times daily as needed for dizziness        omeprazole 20 MG CR capsule    priLOSEC    90 capsule    TAKE ONE CAPSULE BY MOUTH EVERY MORNING BEFORE BREAKFAST    Gastroesophageal reflux disease without esophagitis       predniSONE 20 MG tablet    DELTASONE    20 tablet    Take 3 tabs (60 mg) orally daily for 3 days, 2 tabs (40 mg) orally daily for 3 days, 1 tab (20 mg) orally daily for 3 days, then 1/2 tab (10 mg) orally for 3 days    Multiple joint pain, Vitamin B6 deficiency       PRENATAL 27-1 MG Tabs     90 tablet    TAKE 1 TABLET BY MOUTH ONCE DAILY    Iron deficiency anemia       pyridOXINE 25 MG tablet    vitamin B-6    30 tablet    Take 1 tablet (25 mg) by mouth daily INDICATION: VITAMIN B6 DEFICIENCY    Vitamin B6 deficiency, Multiple joint pain       * Notice:  This list has 2 medication(s) that are the same as other medications prescribed for you. Read the directions carefully, and ask your doctor or other care provider to review them with you.

## 2017-07-31 ENCOUNTER — DOCUMENTATION ONLY (OUTPATIENT)
Dept: INTERNAL MEDICINE | Facility: CLINIC | Age: 42
End: 2017-07-31

## 2017-08-04 DIAGNOSIS — Z13.6 CARDIOVASCULAR SCREENING; LDL GOAL LESS THAN 160: ICD-10-CM

## 2017-08-04 DIAGNOSIS — E55.9 VITAMIN D DEFICIENCY: ICD-10-CM

## 2017-08-04 LAB
ALBUMIN SERPL-MCNC: 3.6 G/DL (ref 3.4–5)
ALP SERPL-CCNC: 75 U/L (ref 40–150)
ALT SERPL W P-5'-P-CCNC: 20 U/L (ref 0–50)
ANION GAP SERPL CALCULATED.3IONS-SCNC: 8 MMOL/L (ref 3–14)
AST SERPL W P-5'-P-CCNC: 13 U/L (ref 0–45)
BILIRUB SERPL-MCNC: 0.4 MG/DL (ref 0.2–1.3)
BUN SERPL-MCNC: 8 MG/DL (ref 7–30)
CALCIUM SERPL-MCNC: 8.8 MG/DL (ref 8.5–10.1)
CHLORIDE SERPL-SCNC: 105 MMOL/L (ref 94–109)
CHOLEST SERPL-MCNC: 155 MG/DL
CO2 SERPL-SCNC: 25 MMOL/L (ref 20–32)
CREAT SERPL-MCNC: 0.6 MG/DL (ref 0.52–1.04)
GFR SERPL CREATININE-BSD FRML MDRD: NORMAL ML/MIN/1.7M2
GLUCOSE SERPL-MCNC: 93 MG/DL (ref 70–99)
HDLC SERPL-MCNC: 57 MG/DL
LDLC SERPL CALC-MCNC: 77 MG/DL
NONHDLC SERPL-MCNC: 98 MG/DL
POTASSIUM SERPL-SCNC: 3.8 MMOL/L (ref 3.4–5.3)
PROT SERPL-MCNC: 7.9 G/DL (ref 6.8–8.8)
SODIUM SERPL-SCNC: 138 MMOL/L (ref 133–144)
TRIGL SERPL-MCNC: 106 MG/DL

## 2017-08-04 PROCEDURE — 82306 VITAMIN D 25 HYDROXY: CPT | Performed by: INTERNAL MEDICINE

## 2017-08-04 PROCEDURE — 36415 COLL VENOUS BLD VENIPUNCTURE: CPT | Performed by: INTERNAL MEDICINE

## 2017-08-04 PROCEDURE — 80053 COMPREHEN METABOLIC PANEL: CPT | Performed by: INTERNAL MEDICINE

## 2017-08-04 PROCEDURE — 80061 LIPID PANEL: CPT | Performed by: INTERNAL MEDICINE

## 2017-08-07 LAB — DEPRECATED CALCIDIOL+CALCIFEROL SERPL-MC: 43 UG/L (ref 20–75)

## 2017-08-25 ENCOUNTER — OFFICE VISIT (OUTPATIENT)
Dept: INTERNAL MEDICINE | Facility: CLINIC | Age: 42
End: 2017-08-25
Payer: COMMERCIAL

## 2017-08-25 ENCOUNTER — RADIANT APPOINTMENT (OUTPATIENT)
Dept: MAMMOGRAPHY | Facility: CLINIC | Age: 42
End: 2017-08-25
Attending: INTERNAL MEDICINE
Payer: COMMERCIAL

## 2017-08-25 VITALS
WEIGHT: 182 LBS | SYSTOLIC BLOOD PRESSURE: 120 MMHG | RESPIRATION RATE: 18 BRPM | HEART RATE: 74 BPM | TEMPERATURE: 98.4 F | BODY MASS INDEX: 29.38 KG/M2 | DIASTOLIC BLOOD PRESSURE: 70 MMHG | OXYGEN SATURATION: 99 %

## 2017-08-25 DIAGNOSIS — E61.1 IRON DEFICIENCY: ICD-10-CM

## 2017-08-25 DIAGNOSIS — E53.8 VITAMIN B12 DEFICIENCY WITHOUT ANEMIA: ICD-10-CM

## 2017-08-25 DIAGNOSIS — M25.50 MULTIPLE JOINT PAIN: ICD-10-CM

## 2017-08-25 DIAGNOSIS — K21.9 GASTROESOPHAGEAL REFLUX DISEASE WITHOUT ESOPHAGITIS: ICD-10-CM

## 2017-08-25 DIAGNOSIS — E53.1 VITAMIN B6 DEFICIENCY: ICD-10-CM

## 2017-08-25 DIAGNOSIS — R49.9 CHANGE IN VOICE: ICD-10-CM

## 2017-08-25 DIAGNOSIS — Z12.31 VISIT FOR SCREENING MAMMOGRAM: ICD-10-CM

## 2017-08-25 DIAGNOSIS — E55.9 VITAMIN D DEFICIENCY: Primary | ICD-10-CM

## 2017-08-25 DIAGNOSIS — E53.8 VITAMIN B12 DEFICIENCY (NON ANEMIC): ICD-10-CM

## 2017-08-25 LAB
T4 FREE SERPL-MCNC: 1.12 NG/DL (ref 0.76–1.46)
TSH SERPL DL<=0.005 MIU/L-ACNC: 1.43 MU/L (ref 0.4–4)
VIT B12 SERPL-MCNC: 1004 PG/ML (ref 193–986)

## 2017-08-25 PROCEDURE — 36415 COLL VENOUS BLD VENIPUNCTURE: CPT | Performed by: INTERNAL MEDICINE

## 2017-08-25 PROCEDURE — 84443 ASSAY THYROID STIM HORMONE: CPT | Performed by: INTERNAL MEDICINE

## 2017-08-25 PROCEDURE — 84439 ASSAY OF FREE THYROXINE: CPT | Performed by: INTERNAL MEDICINE

## 2017-08-25 PROCEDURE — 82607 VITAMIN B-12: CPT | Performed by: INTERNAL MEDICINE

## 2017-08-25 PROCEDURE — 99215 OFFICE O/P EST HI 40 MIN: CPT | Performed by: INTERNAL MEDICINE

## 2017-08-25 PROCEDURE — G0202 SCR MAMMO BI INCL CAD: HCPCS | Mod: TC

## 2017-08-25 RX ORDER — FERROUS SULFATE 325(65) MG
1 TABLET ORAL
Qty: 90 TABLET | Refills: 2 | Status: SHIPPED | OUTPATIENT
Start: 2017-08-25 | End: 2019-09-10

## 2017-08-25 RX ORDER — PYRIDOXINE HCL (VITAMIN B6) 25 MG
25 TABLET ORAL DAILY
Qty: 90 TABLET | Refills: 3 | Status: SHIPPED | OUTPATIENT
Start: 2017-08-25 | End: 2018-03-07

## 2017-08-25 NOTE — LETTER
November 18, 2017      Tye Morales  [id number] [facility] [address]      Dear Ms. Morales,    We are writing to inform you of your test results.    Your test results fall within the expected range(s) or remain unchanged from previous results.  We apologize you have not received these results until now.    As has been previously announced, Dr. Munguia has left our practice as of Nov 7 of this year.  We would encourage you to make an appointment with another of our providers to establish care, and to address any questions you may have about these results.    If you have any other questions or concerns, please call the clinic at the number listed above.       Sincerely,      [facility]  Internal Medicine Providers

## 2017-08-25 NOTE — PROGRESS NOTES
SUBJECTIVE:   Tye Morales is a 42 year old female who presents to clinic today for the following health issues:    Medication Followup of vitamin deficiencies    Taking Medication as prescribed: yes    Side Effects:  None    Medication Helping Symptoms:  Yes       Feels great        She reports that she has felt better with regards to fatigue and muscle aches and pains since her last office visit.  Problem list and histories reviewed & adjusted, as indicated.  Additional history: as documented    Labs reviewed in EPIC    Reviewed and updated as needed this visit by clinical staffTobacco  Allergies  Meds  Med Hx  Surg Hx  Fam Hx  Soc Hx      Reviewed and updated as needed this visit by Provider         ROS:  14 point ROS negative except as above. She also notes that she will have sore throat mostly in the mornings over the past 3 months, other changes include changing her voice. She is not keeping to strict reflux precautions.      OBJECTIVE:     /70  Pulse 74  Temp 98.4  F (36.9  C) (Oral)  Resp 18  Wt 182 lb (82.6 kg)  LMP 08/15/2017  SpO2 99%  BMI 29.38 kg/m2  Body mass index is 29.38 kg/(m^2).  GENERAL: healthy, alert and no distress  EYES: Eyes grossly normal to inspection, PERRL and conjunctivae and sclerae normal  NECK: no adenopathy, no asymmetry, masses, or scars and thyroid normal to palpation  RESP: lungs clear to auscultation - no rales, rhonchi or wheezes  CV: regular rate and rhythm, normal S1 S2, no S3 or S4, no murmur, click or rub, no peripheral edema and peripheral pulses strong  ABDOMEN: soft, nontender, no hepatosplenomegaly, no masses and bowel sounds normal  MS: no gross musculoskeletal defects noted, no edema    Diagnostic Test Results:  Results for orders placed or performed in visit on 08/04/17   Vitamin D Deficiency   Result Value Ref Range    Vitamin D Deficiency screening 43 20 - 75 ug/L   Comprehensive metabolic panel   Result Value Ref Range    Sodium 138 133 -  144 mmol/L    Potassium 3.8 3.4 - 5.3 mmol/L    Chloride 105 94 - 109 mmol/L    Carbon Dioxide 25 20 - 32 mmol/L    Anion Gap 8 3 - 14 mmol/L    Glucose 93 70 - 99 mg/dL    Urea Nitrogen 8 7 - 30 mg/dL    Creatinine 0.60 0.52 - 1.04 mg/dL    GFR Estimate >90  Non  GFR Calc   >60 mL/min/1.7m2    GFR Estimate If Black >90   GFR Calc   >60 mL/min/1.7m2    Calcium 8.8 8.5 - 10.1 mg/dL    Bilirubin Total 0.4 0.2 - 1.3 mg/dL    Albumin 3.6 3.4 - 5.0 g/dL    Protein Total 7.9 6.8 - 8.8 g/dL    Alkaline Phosphatase 75 40 - 150 U/L    ALT 20 0 - 50 U/L    AST 13 0 - 45 U/L   Lipid panel reflex to direct LDL   Result Value Ref Range    Cholesterol 155 <200 mg/dL    Triglycerides 106 <150 mg/dL    HDL Cholesterol 57 >49 mg/dL    LDL Cholesterol Calculated 77 <100 mg/dL    Non HDL Cholesterol 98 <130 mg/dL       ASSESSMENT/PLAN:     DIAGNOSIS/PLAN:     ICD-10-CM    1. Vitamin D deficiency E55.9 cholecalciferol (VITAMIN D3) 86524 UNITS capsule     Vitamin D Deficiency   2. Vitamin B6 deficiency E53.1 pyridOXINE (VITAMIN  B-6) 25 MG tablet   3. Multiple joint pain M25.50 pyridOXINE (VITAMIN  B-6) 25 MG tablet    RESOLVED WITH SUPPLEMENTS   4. Gastroesophageal reflux disease without esophagitis K21.9 omeprazole (PRILOSEC) 20 MG CR capsule   5. Iron deficiency E61.1 ferrous sulfate (IRON) 325 (65 FE) MG tablet   6. Change in voice R49.9 TSH     T4 FREE   7. Vitamin B12 deficiency without anemia E53.8 Vitamin B12       SIGNIFICANT ISSUES RE The primary encounter diagnosis was Vitamin D deficiency. Diagnoses of Vitamin B6 deficiency, Multiple joint pain, Gastroesophageal reflux disease without esophagitis, Iron deficiency, Change in voice, and Vitamin B12 deficiency without anemia were also pertinent to this visit. AS NOTED AND ADDRESSED ABOVE   MEDS AND AND LABS AS ORDERED TO ADDRESS PREVIOUS AND CURRENT ABNORMAL INDICES    SEE PATIENT INSTRUCTION SECTION FOR FOLLOW UP PLAN    Tye IS TO CONTINUE  OTHER TREATMENT REGIMEN/PLANS EXCEPT AS INDICATED    COMPLIANCE WITH MEDICATIONS DIET AND EXERCISE PLANS ENCOURAGED    DISCONTINUED MEDS:  Medications Discontinued During This Encounter   Medication Reason     predniSONE (DELTASONE) 20 MG tablet Therapy completed     meclizine (ANTIVERT) 12.5 MG tablet      cholecalciferol 5000 UNITS CAPS Reorder     pyridOXINE (VITAMIN  B-6) 25 MG tablet Reorder     omeprazole (PRILOSEC) 20 MG CR capsule Reorder     ferrous sulfate (IRON) 325 (65 FE) MG tablet Reorder       CURRENT MED LIST WITH CHANGES AS NOTED BELOW:  Current Outpatient Prescriptions   Medication Sig Dispense Refill     cholecalciferol (VITAMIN D3) 21054 UNITS capsule 1 CAP 2 X A WK FOR 2 WKS, THEN 1 CAP 1ST AND 15TH OF EACH MONTH, FOR VIT D DEFICIENCY 40 capsule 0     pyridOXINE (VITAMIN  B-6) 25 MG tablet Take 1 tablet (25 mg) by mouth daily INDICATION: VITAMIN B6 DEFICIENCY 90 tablet 3     omeprazole (PRILOSEC) 20 MG CR capsule TAKE ONE CAPSULE BY MOUTH EVERY MORNING BEFORE BREAKFAST 90 capsule 3     ferrous sulfate (IRON) 325 (65 FE) MG tablet Take 1 tablet (325 mg) by mouth daily (with breakfast) IRON SUPPLEMENT, TAKE WITH 4 OZ OF APPLE JUICE 90 tablet 2     hydroquinone 4 % CREA Apply to face BID x 6-12 months then PRN only 30 g 5     PRENATAL 27-1 MG TABS TAKE 1 TABLET BY MOUTH ONCE DAILY 90 tablet 3     ibuprofen (ADVIL,MOTRIN) 800 MG tablet Take 1 tablet (800 mg) by mouth every 8 hours as needed for moderate pain 60 tablet 1     Calcium Carb-Cholecalciferol (CALCIUM 1000 + D) 1000-800 MG-UNIT TABS Take 1 tablet by mouth daily TAKE WITH FOOD, FOR BONE HEALTH AND LOW VITAMIN D ([facility]) 100 tablet PRN     cyclobenzaprine (FLEXERIL) 5 MG tablet Take 1 tablet (5 mg) by mouth nightly as needed for muscle spasms 42 tablet 0     Cyanocobalamin (B-12 SUPER STRENGTH) 5000 MCG/ML LIQD Place 1 mL under the tongue daily FOR VITAMIN B12 SUPPLEMENTATION, PLEASE PLACE UNDER THE TONGUE 2 Bottle  PRN     fluticasone (FLONASE) 50 MCG/ACT nasal spray Spray 2 sprays in nostril At Bedtime INDICATION: TO CONTROL NASAL ALLERGY SYMPTOMS, DO NOT BLOW NOSE FOR 30 MINUTES AFTER USING 16 g PRN     ibuprofen (ADVIL,MOTRIN) 600 MG tablet Take 1 tablet (600 mg) by mouth every 8 hours as needed for moderate pain MUST TAKE WITH FOOD (Patient not taking: Reported on 8/25/2017) 90 tablet 1         Office visit time > 40 mins, greater than 50% of which was spent obtaining history, reviewing medications, counseling re compliance with treatment plan, discussion of treatment, follow up plans, and coordination of care.     OFFICE VISIT VISIT ACCOMPLISHED WITH THE HELP OF [place]           Patient Instructions         ** FOLLOW UP PLAN**:    PLEASE SCHEDULE LABS APPROXIMATELY 2  MONTHS FROM TODAY - before the end of October 2017 TO FOLLOW UP ON your vitamin D level     I will review the results of your tests from today with you by telephone    Please also be sure to schedule your mammogram at the     YOU MAY CONTACT THE CLINIC IF ANY QUESTIONS OR CONCERNS -[phone] OR VIA [employer]             Tips to Control Acid Reflux    To control acid reflux, you ll need to make some basic diet and lifestyle changes. The simple steps outlined below may be all you ll need to ease discomfort.  Watch what you eat    Avoid fatty foods and spicy foods.    Eat fewer acidic foods, such as citrus and tomato-based foods. These can increase symptoms.    Limit drinking alcohol, caffeine, and fizzy beverages. All increase acid reflux.    Try limiting chocolate, peppermint, and spearmint. These can worsen acid reflux in some people.  Watch when you eat    Avoid lying down for 3 hours after eating.    Do not snack before going to bed.  Raise your head  Raising your head and upper body by 4 to 6 inches helps limit reflux when you re lying down. Put blocks under the head of your bed frame to raise it.  Other changes    Lose weight, if  you need to    Don t exercise near bedtime    Avoid tight-fitting clothes    Limit aspirin and ibuprofen    Stop smoking   Date Last Reviewed: 7/1/2016 2000-2017 [employer]. [address] [place] [address], [place] [address]. All rights reserved. This information is not intended as a substitute for professional medical care. Always follow your healthcare professional's instructions.        Lifestyle Changes for Controlling GERD  When you have GERD, stomach acid feels as if it s backing up toward your mouth. Whether or not you take medicine to control your GERD, your symptoms can often be improved with lifestyle changes. Talk to your healthcare provider about the following suggestions. These suggestions may help you get relief from your symptoms.      Raise your head  Reflux is more likely to strike when you re lying down flat, because stomach fluid can flow backward more easily. Raising the head of your bed 4 to 6 inches can help. To do this:    Slide blocks or books under the legs at the head of your bed. Or, place a wedge under the mattress. Many [employer] can make a suitable wedge for you. The wedge should run from your waist to the top of your head.    Don t just prop your head on several pillows. This increases pressure on your stomach. It can make GERD worse.  Watch your eating habits  Certain foods may increase the acid in your stomach or relax the lower esophageal sphincter. This makes GERD more likely. It s best to avoid the following if they cause you symptoms:    Coffee, tea, and carbonated drinks (with and without caffeine)    Fatty, fried, or spicy food    Mint, chocolate, onions, and tomatoes    Peppermint    Any other foods that seem to irritate your stomach or cause you pain  Relieve the pressure  Tips include the following:    Eat smaller meals, even if you have to eat more often.    Don t lie down right after you eat. Wait a few hours for your stomach to empty.    Avoid tight belts and  tight-fitting clothes.    Lose excess weight.  Tobacco and alcohol  Avoid smoking tobacco and drinking alcohol. They can make GERD symptoms worse.  Date Last Reviewed: 7/1/2016 2000-2017 [employer]. [address] [place] [address], [place] [address]. All rights reserved. This information is not intended as a substitute for professional medical care. Always follow your healthcare professional's instructions.            Rosa Munguia MD  [facility]

## 2017-08-25 NOTE — PATIENT INSTRUCTIONS
** FOLLOW UP PLAN**:    PLEASE SCHEDULE LABS APPROXIMATELY 2  MONTHS FROM TODAY - before the end of October 2017 TO FOLLOW UP ON your vitamin D level     I will review the results of your tests from today with you by telephone    Please also be sure to schedule your mammogram at the     YOU MAY CONTACT THE CLINIC IF ANY QUESTIONS OR CONCERNS -[phone] OR VIA [employer]             Tips to Control Acid Reflux    To control acid reflux, you ll need to make some basic diet and lifestyle changes. The simple steps outlined below may be all you ll need to ease discomfort.  Watch what you eat    Avoid fatty foods and spicy foods.    Eat fewer acidic foods, such as citrus and tomato-based foods. These can increase symptoms.    Limit drinking alcohol, caffeine, and fizzy beverages. All increase acid reflux.    Try limiting chocolate, peppermint, and spearmint. These can worsen acid reflux in some people.  Watch when you eat    Avoid lying down for 3 hours after eating.    Do not snack before going to bed.  Raise your head  Raising your head and upper body by 4 to 6 inches helps limit reflux when you re lying down. Put blocks under the head of your bed frame to raise it.  Other changes    Lose weight, if you need to    Don t exercise near bedtime    Avoid tight-fitting clothes    Limit aspirin and ibuprofen    Stop smoking   Date Last Reviewed: 7/1/2016 2000-2017 [employer]. [address] [place] [address], [place] [address]. All rights reserved. This information is not intended as a substitute for professional medical care. Always follow your healthcare professional's instructions.        Lifestyle Changes for Controlling GERD  When you have GERD, stomach acid feels as if it s backing up toward your mouth. Whether or not you take medicine to control your GERD, your symptoms can often be improved with lifestyle changes. Talk to your healthcare provider about the following suggestions. These  suggestions may help you get relief from your symptoms.      Raise your head  Reflux is more likely to strike when you re lying down flat, because stomach fluid can flow backward more easily. Raising the head of your bed 4 to 6 inches can help. To do this:    Slide blocks or books under the legs at the head of your bed. Or, place a wedge under the mattress. Many foam stores can make a suitable wedge for you. The wedge should run from your waist to the top of your head.    Don t just prop your head on several pillows. This increases pressure on your stomach. It can make GERD worse.  Watch your eating habits  Certain foods may increase the acid in your stomach or relax the lower esophageal sphincter. This makes GERD more likely. It s best to avoid the following if they cause you symptoms:    Coffee, tea, and carbonated drinks (with and without caffeine)    Fatty, fried, or spicy food    Mint, chocolate, onions, and tomatoes    Peppermint    Any other foods that seem to irritate your stomach or cause you pain  Relieve the pressure  Tips include the following:    Eat smaller meals, even if you have to eat more often.    Don t lie down right after you eat. Wait a few hours for your stomach to empty.    Avoid tight belts and tight-fitting clothes.    Lose excess weight.  Tobacco and alcohol  Avoid smoking tobacco and drinking alcohol. They can make GERD symptoms worse.  Date Last Reviewed: 7/1/2016 2000-2017 [employer]. [address] [place] [address], [place] [address]. All rights reserved. This information is not intended as a substitute for professional medical care. Always follow your healthcare professional's instructions.

## 2017-08-25 NOTE — MR AVS SNAPSHOT
After Visit Summary   8/25/2017    Tye Morales    MRN: [id number]           Patient Information     Date Of Birth          1975        Visit Information        Provider Department      8/25/2017 10:45 AM Rosa Munguia MD; [employer] [facility]        Today's Diagnoses     Vitamin D deficiency    -  1    Vitamin B6 deficiency        Multiple joint pain        Gastroesophageal reflux disease without esophagitis        Iron deficiency        Change in voice        Vitamin B12 deficiency without anemia        Vitamin B12 deficiency (non anemic)          Care Instructions        ** FOLLOW UP PLAN**:    PLEASE SCHEDULE LABS APPROXIMATELY 2  MONTHS FROM TODAY - before the end of October 2017 TO FOLLOW UP ON your vitamin D level     I will review the results of your tests from today with you by telephone    YOU MAY CONTACT THE CLINIC IF ANY QUESTIONS OR CONCERNS -[phone] OR VIA [employer]             Tips to Control Acid Reflux    To control acid reflux, you ll need to make some basic diet and lifestyle changes. The simple steps outlined below may be all you ll need to ease discomfort.  Watch what you eat    Avoid fatty foods and spicy foods.    Eat fewer acidic foods, such as citrus and tomato-based foods. These can increase symptoms.    Limit drinking alcohol, caffeine, and fizzy beverages. All increase acid reflux.    Try limiting chocolate, peppermint, and spearmint. These can worsen acid reflux in some people.  Watch when you eat    Avoid lying down for 3 hours after eating.    Do not snack before going to bed.  Raise your head  Raising your head and upper body by 4 to 6 inches helps limit reflux when you re lying down. Put blocks under the head of your bed frame to raise it.  Other changes    Lose weight, if you need to    Don t exercise near bedtime    Avoid tight-fitting clothes    Limit aspirin and ibuprofen    Stop smoking   Date Last  Reviewed: 7/1/2016 2000-2017 [employer]. [address] [place] [address], [place] [address]. All rights reserved. This information is not intended as a substitute for professional medical care. Always follow your healthcare professional's instructions.        Lifestyle Changes for Controlling GERD  When you have GERD, stomach acid feels as if it s backing up toward your mouth. Whether or not you take medicine to control your GERD, your symptoms can often be improved with lifestyle changes. Talk to your healthcare provider about the following suggestions. These suggestions may help you get relief from your symptoms.      Raise your head  Reflux is more likely to strike when you re lying down flat, because stomach fluid can flow backward more easily. Raising the head of your bed 4 to 6 inches can help. To do this:    Slide blocks or books under the legs at the head of your bed. Or, place a wedge under the mattress. Many [employer] can make a suitable wedge for you. The wedge should run from your waist to the top of your head.    Don t just prop your head on several pillows. This increases pressure on your stomach. It can make GERD worse.  Watch your eating habits  Certain foods may increase the acid in your stomach or relax the lower esophageal sphincter. This makes GERD more likely. It s best to avoid the following if they cause you symptoms:    Coffee, tea, and carbonated drinks (with and without caffeine)    Fatty, fried, or spicy food    Mint, chocolate, onions, and tomatoes    Peppermint    Any other foods that seem to irritate your stomach or cause you pain  Relieve the pressure  Tips include the following:    Eat smaller meals, even if you have to eat more often.    Don t lie down right after you eat. Wait a few hours for your stomach to empty.    Avoid tight belts and tight-fitting clothes.    Lose excess weight.  Tobacco and alcohol  Avoid smoking tobacco and drinking alcohol. They can make GERD  "symptoms worse.  Date Last Reviewed: 7/1/2016 2000-2017 [employer]. [address] [place] [address], [place] [address]. All rights reserved. This information is not intended as a substitute for professional medical care. Always follow your healthcare professional's instructions.                Follow-ups after your visit        Your next 10 appointments already scheduled     Sep 12, 2017  4:00 PM CDT   [facility] Zoey [facility]-C   [facility]    [address] [id number] [phone]              Future tests that were ordered for you today     Open Future Orders        Priority Expected Expires Ordered    Vitamin D Deficiency Routine 8/25/2017 2/22/2018 8/25/2017            Who to contact     If you have questions or need follow up information about today's clinic visit or your schedule please contact [facility] directly at [phone].  Normal or non-critical lab and imaging results will be communicated to you by [employer], letter or phone within 4 business days after the clinic has received the results. If you do not hear from us within 7 days, please contact the clinic through [employer] or phone. If you have a critical or abnormal lab result, we will notify you by phone as soon as possible.  Submit refill requests through [employer] or call your pharmacy and they will forward the refill request to us. Please allow 3 business days for your refill to be completed.          Additional Information About Your Visit        [employer] Information     [employer] lets you send messages to your doctor, view your test results, renew your prescriptions, schedule appointments and more. To sign up, go to [email or web address] . Click on \"Log in\" on the left side of the screen, which will take you to the Welcome page. Then click on \"Sign up Now\" on the right side of the page.     You will be asked to " enter the access code listed below, as well as some personal information. Please follow the directions to create your username and password.     Your access code is: [email or web address]  Expires: 2017  4:15 PM     Your access code will  in 90 days. If you need help or a new code, please call your [place] clinic or [phone].        Care EveryWhere ID     This is your Care EveryWhere ID. This could be used by other organizations to access your [place] medical records  [id number]        Your Vitals Were     Pulse Temperature Respirations Last Period Pulse Oximetry BMI (Body Mass Index)    74 98.4  F (36.9  C) (Oral) 18 08/15/2017 99% 29.38 kg/m2       Blood Pressure from Last 3 Encounters:   17 120/70   17 115/80   17 112/68    Weight from Last 3 Encounters:   17 182 lb (82.6 kg)   17 183 lb (83 kg)   17 186 lb (84.4 kg)              We Performed the Following     T4 FREE     TSH     Vitamin B12          Today's Medication Changes          These changes are accurate as of: 17 12:44 PM.  If you have any questions, ask your nurse or doctor.               These medicines have changed or have updated prescriptions.        Dose/Directions    cholecalciferol 68205 UNITS capsule   Commonly known as:  VITAMIN D3   This may have changed:    - medication strength  - how much to take  - how to take this  - when to take this  - additional instructions   Used for:  Vitamin D deficiency   Changed by:  Rosa Munguia MD        1 CAP 2 X A WK FOR 2 WKS, THEN 1 CAP 1ST AND 15TH OF EACH MONTH, FOR VIT D DEFICIENCY   Quantity:  40 capsule   Refills:  0       ibuprofen 600 MG tablet   Commonly known as:  ADVIL/MOTRIN   This may have changed:  Another medication with the same name was removed. Continue taking this medication, and follow the directions you see here.   Used for:  Chest wall pain, Pain of left upper arm   Changed by:  Rosa Munguia MD        Dose:  600 mg   Take 1  tablet (600 mg) by mouth every 8 hours as needed for moderate pain MUST TAKE WITH FOOD   Quantity:  90 tablet   Refills:  1       omeprazole 20 MG CR capsule   Commonly known as:  priLOSEC   This may have changed:  See the new instructions.   Used for:  Gastroesophageal reflux disease without esophagitis   Changed by:  Rosa Munguia MD        TAKE ONE CAPSULE BY MOUTH EVERY MORNING BEFORE BREAKFAST   Quantity:  90 capsule   Refills:  3         Stop taking these medicines if you haven't already. Please contact your care team if you have questions.     cyclobenzaprine 5 MG tablet   Commonly known as:  FLEXERIL   Stopped by:  Rosa Munguia MD           meclizine 12.5 MG tablet   Commonly known as:  ANTIVERT   Stopped by:  Rosa Munguia MD                Where to get your medicines      These medications were sent to [facility] [address] [address]     Phone:  [phone]     Calcium Carb-Cholecalciferol 1000-800 MG-UNIT Tabs    cholecalciferol 02043 UNITS capsule    Cyanocobalamin 5000 MCG/ML Liqd    ferrous sulfate 325 (65 FE) MG tablet    omeprazole 20 MG CR capsule    pyridOXINE 25 MG tablet                Primary Care Provider Office Phone # Fax #    Rosa Munguia [phone]       [address] [place] [address]        Equal Access to Services     JAKOB GOMEZ AH: Hadii lizzie ku hadasho Soomaali, waaxda luqadaha, qaybta kaalmada dianeyada, ilya morales. So [facility] [phone].    ATENCIÓN: Si habla español, tiene a santiago disposición servicios gratuitos de asistencia lingüística. Ronnell al [phone].    We comply with applicable federal civil rights laws and [place] laws. We do not discriminate on the basis of race, color, national origin, age, disability sex, sexual orientation or gender identity.            Thank you!     Thank you for choosing [facility]  [facility]  for your care. Our goal is always to provide you with excellent care. Hearing back from our patients is one way we can continue to improve our services. Please take a few minutes to complete the written survey that you may receive in the mail after your visit with us. Thank you!             Your Updated Medication List - Protect others around you: Learn how to safely use, store and throw away your medicines at www.disposemymeds.org.          This list is accurate as of: 8/25/17 12:44 PM.  Always use your most recent med list.                   Brand Name Dispense Instructions for use Diagnosis    Calcium Carb-Cholecalciferol 1000-800 MG-UNIT Tabs    CALCIUM 1000 + D    100 tablet    Take 1 tablet by mouth daily TAKE WITH FOOD, FOR BONE HEALTH AND LOW VITAMIN D ([facility], [facility])    Vitamin D deficiency       cholecalciferol 82238 UNITS capsule    VITAMIN D3    40 capsule    1 CAP 2 X A WK FOR 2 WKS, THEN 1 CAP 1ST AND 15TH OF EACH MONTH, FOR VIT D DEFICIENCY    Vitamin D deficiency       Cyanocobalamin 5000 MCG/ML Liqd    B-12 SUPER STRENGTH    2 Bottle    Place 1 mL under the tongue daily FOR VITAMIN B12 SUPPLEMENTATION, PLEASE PLACE UNDER THE TONGUE    Vitamin B12 deficiency (non anemic)       ferrous sulfate 325 (65 FE) MG tablet    IRON    90 tablet    Take 1 tablet (325 mg) by mouth daily (with breakfast) IRON SUPPLEMENT, TAKE WITH 4 OZ OF APPLE JUICE    Iron deficiency       fluticasone 50 MCG/ACT spray    FLONASE    16 g    Spray 2 sprays in nostril At Bedtime INDICATION: TO CONTROL NASAL ALLERGY SYMPTOMS, DO NOT BLOW NOSE FOR 30 MINUTES AFTER USING    Allergic rhinitis, unspecified allergic rhinitis type       hydroquinone 4 % Crea     30 g    Apply to face BID x 6-12 months then PRN only    Melasma       ibuprofen 600 MG tablet    ADVIL/MOTRIN    90 tablet    Take 1 tablet (600 mg) by mouth every 8 hours as needed for moderate pain MUST TAKE WITH FOOD    Chest wall pain, Pain of left  upper arm       omeprazole 20 MG CR capsule    priLOSEC    90 capsule    TAKE ONE CAPSULE BY MOUTH EVERY MORNING BEFORE BREAKFAST    Gastroesophageal reflux disease without esophagitis       PRENATAL 27-1 MG Tabs     90 tablet    TAKE 1 TABLET BY MOUTH ONCE DAILY    Iron deficiency anemia       pyridOXINE 25 MG tablet    vitamin B-6    90 tablet    Take 1 tablet (25 mg) by mouth daily INDICATION: VITAMIN B6 DEFICIENCY    Vitamin B6 deficiency, Multiple joint pain

## 2017-08-25 NOTE — NURSING NOTE
"Chief Complaint   Patient presents with     Results     vitamin deficiencies       Initial /70  Pulse 74  Temp 98.4  F (36.9  C) (Oral)  Resp 18  Wt 182 lb (82.6 kg)  LMP 08/15/2017  SpO2 99%  BMI 29.38 kg/m2 Estimated body mass index is 29.38 kg/(m^2) as calculated from the following:    Height as of 7/3/17: 5' 6\" (1.676 m).    Weight as of this encounter: 182 lb (82.6 kg).  Blood pressure completed using cuff size: regular      "

## 2018-01-19 ENCOUNTER — OFFICE VISIT (OUTPATIENT)
Dept: URGENT CARE | Facility: URGENT CARE | Age: 43
End: 2018-01-19
Payer: COMMERCIAL

## 2018-01-19 VITALS
OXYGEN SATURATION: 97 % | WEIGHT: 191.7 LBS | RESPIRATION RATE: 18 BRPM | BODY MASS INDEX: 30.94 KG/M2 | SYSTOLIC BLOOD PRESSURE: 114 MMHG | TEMPERATURE: 96.8 F | DIASTOLIC BLOOD PRESSURE: 78 MMHG | HEART RATE: 86 BPM

## 2018-01-19 DIAGNOSIS — J01.10 ACUTE FRONTAL SINUSITIS, RECURRENCE NOT SPECIFIED: Primary | ICD-10-CM

## 2018-01-19 DIAGNOSIS — R11.0 NAUSEA: ICD-10-CM

## 2018-01-19 DIAGNOSIS — R07.0 THROAT PAIN: ICD-10-CM

## 2018-01-19 PROCEDURE — 99214 OFFICE O/P EST MOD 30 MIN: CPT | Performed by: PHYSICIAN ASSISTANT

## 2018-01-19 RX ORDER — AMOXICILLIN 500 MG/1
500 CAPSULE ORAL 3 TIMES DAILY
Qty: 30 CAPSULE | Refills: 0 | Status: SHIPPED | OUTPATIENT
Start: 2018-01-19 | End: 2018-03-07

## 2018-01-19 RX ORDER — ACETAMINOPHEN 500 MG
1000 TABLET ORAL EVERY 6 HOURS PRN
Qty: 50 TABLET | Refills: 0 | Status: SHIPPED | OUTPATIENT
Start: 2018-01-19 | End: 2018-03-07

## 2018-01-19 RX ORDER — ONDANSETRON 4 MG/1
4 TABLET, ORALLY DISINTEGRATING ORAL EVERY 8 HOURS PRN
Qty: 12 TABLET | Refills: 0 | Status: SHIPPED | OUTPATIENT
Start: 2018-01-19 | End: 2018-02-20

## 2018-01-19 NOTE — NURSING NOTE
"Chief Complaint   Patient presents with     Urgent Care     Pt states sore throat 1x week pain, nausea, headaches,1x days  some minor back pain 2x days        Initial /78  Pulse 86  Temp 96.8  F (36  C) (Oral)  Resp 18  Wt 191 lb 11.2 oz (87 kg)  LMP 01/08/2018 (Exact Date)  SpO2 97%  BMI 30.94 kg/m2 Estimated body mass index is 30.94 kg/(m^2) as calculated from the following:    Height as of 7/3/17: 5' 6\" (1.676 m).    Weight as of this encounter: 191 lb 11.2 oz (87 kg).  Medication Reconciliation: complete      "

## 2018-01-19 NOTE — MR AVS SNAPSHOT
"              After Visit Summary   1/19/2018    Tye Morales    MRN: [id number]           Patient Information     Date Of Birth          1975        Visit Information        Provider Department      1/19/2018 3:35 PM Jimmy Pathak PA-C [facility]        Today's Diagnoses     Acute frontal sinusitis, recurrence not specified    -  1    Throat pain        Nausea           Follow-ups after your visit        Your next 10 appointments already scheduled     Feb 20, 2018  2:00 PM CST   [facility]    [address]  [facility] [id number] [phone]           Bring a current list of meds and any records pertaining to this visit. For Physicals, please bring immunization records and any forms needing to be filled out. Please arrive 10 minutes early to complete paperwork.              Who to contact     If you have questions or need follow up information about today's clinic visit or your schedule please contact [facility] directly at [phone].  Normal or non-critical lab and imaging results will be communicated to you by [employer], letter or phone within 4 business days after the clinic has received the results. If you do not hear from us within 7 days, please contact the clinic through [employer] or phone. If you have a critical or abnormal lab result, we will notify you by phone as soon as possible.  Submit refill requests through [employer] or call your pharmacy and they will forward the refill request to us. Please allow 3 business days for your refill to be completed.          Additional Information About Your Visit        MyChart Information     [employer] lets you send messages to your doctor, view your test results, renew your prescriptions, schedule appointments and more. To sign up, go to [email or web address] . Click on \"Log in\" on the left side " "of the screen, which will take you to the Welcome page. Then click on \"Sign up Now\" on the right side of the page.     You will be asked to enter the access code listed below, as well as some personal information. Please follow the directions to create your username and password.     Your access code is: [email or web address]  Expires: 2018  9:36 AM     Your access code will  in 90 days. If you need help or a new code, please call your [facility] or [phone].        Care EveryWhere ID     This is your Care EveryWhere ID. This could be used by other organizations to access your [place] medical records  [id number]        Your Vitals Were     Pulse Temperature Respirations Last Period Pulse Oximetry BMI (Body Mass Index)    86 96.8  F (36  C) (Oral) 18 2018 (Exact Date) 97% 30.94 kg/m2       Blood Pressure from Last 3 Encounters:   18 114/78   17 120/70   17 115/80    Weight from Last 3 Encounters:   18 191 lb 11.2 oz (87 kg)   17 182 lb (82.6 kg)   17 183 lb (83 kg)              Today, you had the following     No orders found for display         Today's Medication Changes          These changes are accurate as of: 18 11:59 PM.  If you have any questions, ask your nurse or doctor.               Start taking these medicines.        Dose/Directions    acetaminophen 500 MG tablet   Commonly known as:  TYLENOL   Used for:  Throat pain   Started by:  Jimmy Pathak PA-C        Dose:  1000 mg   Take 2 tablets (1,000 mg) by mouth every 6 hours as needed for mild pain   Quantity:  50 tablet   Refills:  0       amoxicillin 500 MG capsule   Commonly known as:  AMOXIL   Used for:  Acute frontal sinusitis, recurrence not specified   Started by:  Jimmy Pathak PA-C        Dose:  500 mg   Take 1 capsule (500 mg) by mouth 3 times daily   Quantity:  30 capsule   Refills:  0       ondansetron 4 MG ODT tab   Commonly known as:  ZOFRAN ODT   Used for:  Nausea   Started by: "  Jimmy Pathak, TELMA        Dose:  4 mg   Take 1 tablet (4 mg) by mouth every 8 hours as needed for nausea   Quantity:  12 tablet   Refills:  0            Where to get your medicines      These medications were sent to [facility] [address]  [address], [facility] [address]     Phone:  [phone]     acetaminophen 500 MG tablet    amoxicillin 500 MG capsule    ondansetron 4 MG ODT tab                Primary Care Provider Office Phone # Fax #    Rosa Munguia [phone]       XXX RESIGNED XXX  [facility] [address]        Equal Access to Services     SKYE GOMEZ : Hadii lizzie scott hadasho Sojacekali, waaxda luqadaha, qaybta kaalmada adeegyada, ilya wright . So [facility] [phone].    ATENCIÓN: Si habla español, tiene a santiago disposición servicios gratuitos de asistencia lingüística. [facility] [phone].    We comply with applicable federal civil rights laws and [place] laws. We do not discriminate on the basis of race, color, national origin, age, disability, sex, sexual orientation, or gender identity.            Thank you!     Thank you for choosing [facility]  for your care. Our goal is always to provide you with excellent care. Hearing back from our patients is one way we can continue to improve our services. Please take a few minutes to complete the written survey that you may receive in the mail after your visit with us. Thank you!             Your Updated Medication List - Protect others around you: Learn how to safely use, store and throw away your medicines at www.disposemymeds.org.          This list is accurate as of: 1/19/18 11:59 PM.  Always use your most recent med list.                   Brand Name Dispense Instructions for use Diagnosis    acetaminophen 500 MG tablet    TYLENOL    50 tablet    Take 2 tablets (1,000 mg) by mouth every 6 hours as needed for mild pain    Throat  pain       amoxicillin 500 MG capsule    AMOXIL    30 capsule    Take 1 capsule (500 mg) by mouth 3 times daily    Acute frontal sinusitis, recurrence not specified       Calcium Carb-Cholecalciferol 1000-800 MG-UNIT Tabs    CALCIUM 1000 + D    100 tablet    Take 1 tablet by mouth daily TAKE WITH FOOD, FOR BONE HEALTH AND LOW VITAMIN D ([facility])    Vitamin D deficiency       cholecalciferol 80516 UNITS capsule    VITAMIN D3    40 capsule    1 CAP 2 X A WK FOR 2 WKS, THEN 1 CAP 1ST AND 15TH OF EACH MONTH, FOR VIT D DEFICIENCY    Vitamin D deficiency       Cyanocobalamin 5000 MCG/ML Liqd    B-12 SUPER STRENGTH    2 Bottle    Place 1 mL under the tongue daily FOR VITAMIN B12 SUPPLEMENTATION, PLEASE PLACE UNDER THE TONGUE    Vitamin B12 deficiency (non anemic)       ferrous sulfate 325 (65 FE) MG tablet    IRON    90 tablet    Take 1 tablet (325 mg) by mouth daily (with breakfast) IRON SUPPLEMENT, TAKE WITH 4 OZ OF APPLE JUICE    Iron deficiency       fluticasone 50 MCG/ACT spray    FLONASE    16 g    Spray 2 sprays in nostril At Bedtime INDICATION: TO CONTROL NASAL ALLERGY SYMPTOMS, DO NOT BLOW NOSE FOR 30 MINUTES AFTER USING    Allergic rhinitis, unspecified allergic rhinitis type       hydroquinone 4 % Crea     30 g    Apply to face BID x 6-12 months then PRN only    Melasma       ibuprofen 600 MG tablet    ADVIL/MOTRIN    90 tablet    Take 1 tablet (600 mg) by mouth every 8 hours as needed for moderate pain MUST TAKE WITH FOOD    Chest wall pain, Pain of left upper arm       omeprazole 20 MG CR capsule    priLOSEC    90 capsule    TAKE ONE CAPSULE BY MOUTH EVERY MORNING BEFORE BREAKFAST    Gastroesophageal reflux disease without esophagitis       ondansetron 4 MG ODT tab    ZOFRAN ODT    12 tablet    Take 1 tablet (4 mg) by mouth every 8 hours as needed for nausea    Nausea       PRENATAL 27-1 MG Tabs     90 tablet    TAKE 1 TABLET BY MOUTH ONCE DAILY    Iron deficiency anemia       pyridOXINE  25 MG tablet    vitamin B-6    90 tablet    Take 1 tablet (25 mg) by mouth daily INDICATION: VITAMIN B6 DEFICIENCY    Vitamin B6 deficiency, Multiple joint pain

## 2018-01-22 NOTE — PROGRESS NOTES
SUBJECTIVE:   Tye Morales is a 43 year old female presenting with a chief complaint of sinus congestion, sore throat .  Onset of symptoms was 1 week(s) ago.  Course of illness is worsening.    Severity moderate  Current and Associated symptoms: sinus congestion, coughing  Treatment measures tried include OTC Cough med.  Predisposing factors include recent illness.    Past Medical History:   Diagnosis Date     Abdominal pain, epigastric      Abnormal Pap smear     colposcopy- has since been normal     Anemia      Constipation      Gastro-oesophageal reflux disease      Helicobacter pylori (H. pylori) 8-2007     History of colposcopy with cervical biopsy 7/15/11    [place]. Scan only- no biopsies     LSIL (low grade squamous intraepithelial lesion) on Pap smear 6/17/11     Neutropenia associated with autoimmune disease (H) 2007    Saw hematologist        Allergies   Allergen Reactions     Nkda [No Known Drug Allergies]          Social History   Substance Use Topics     Smoking status: Never Smoker     Smokeless tobacco: Never Used     Alcohol use No       ROS:  CONSTITUTIONAL:NEGATIVE for fever, chills, change in weight  INTEGUMENTARY/SKIN: NEGATIVE for worrisome rashes, moles or lesions  ENT/MOUTH: POSITIVE for sore throat, nasal congestion  RESP:POSITIVE for coughing, chest congestion  CV: NEGATIVE for chest pain, palpitations or peripheral edema  GI: NEGATIVE for nausea, abdominal pain, heartburn, or change in bowel habits  MUSCULOSKELETAL: NEGATIVE for significant arthralgias or myalgia  NEURO: NEGATIVE for weakness, dizziness or paresthesias    OBJECTIVE  :/78  Pulse 86  Temp 96.8  F (36  C) (Oral)  Resp 18  Wt 191 lb 11.2 oz (87 kg)  LMP 01/08/2018 (Exact Date)  SpO2 97%  BMI 30.94 kg/m2  GENERAL APPEARANCE: healthy, alert and no distress  EYES: EOMI,  PERRL, conjunctiva clear  HENT: TM's normal bilaterally and nasal turbinates erythematous, swollen  NECK: supple, nontender, no  lymphadenopathy  RESP: lungs clear to auscultation - no rales, rhonchi or wheezes  CV: regular rates and rhythm, normal S1 S2, no murmur noted  NEURO: Normal strength and tone, sensory exam grossly normal,  normal speech and mentation  SKIN: no suspicious lesions or rashes    ASSESSMENT/PLAN      ICD-10-CM    1. Acute frontal sinusitis, recurrence not specified J01.10 amoxicillin (AMOXIL) 500 MG capsule   2. Throat pain R07.0 acetaminophen (TYLENOL) 500 MG tablet   3. Nausea R11.0 ondansetron (ZOFRAN ODT) 4 MG ODT tab         Follow up as needed  See orders in Epic

## 2018-02-20 ENCOUNTER — OFFICE VISIT (OUTPATIENT)
Dept: OBGYN | Facility: CLINIC | Age: 43
End: 2018-02-20
Payer: COMMERCIAL

## 2018-02-20 VITALS
SYSTOLIC BLOOD PRESSURE: 96 MMHG | DIASTOLIC BLOOD PRESSURE: 70 MMHG | BODY MASS INDEX: 29.6 KG/M2 | WEIGHT: 183.4 LBS | HEART RATE: 76 BPM

## 2018-02-20 DIAGNOSIS — R10.84 ABDOMINAL PAIN, GENERALIZED: Primary | ICD-10-CM

## 2018-02-20 DIAGNOSIS — N89.8 VAGINAL ITCHING: ICD-10-CM

## 2018-02-20 LAB
ALBUMIN UR-MCNC: NEGATIVE MG/DL
APPEARANCE UR: CLEAR
BILIRUB UR QL STRIP: NEGATIVE
COLOR UR AUTO: YELLOW
GLUCOSE UR STRIP-MCNC: NEGATIVE MG/DL
HGB UR QL STRIP: NEGATIVE
KETONES UR STRIP-MCNC: NEGATIVE MG/DL
LEUKOCYTE ESTERASE UR QL STRIP: NEGATIVE
NITRATE UR QL: NEGATIVE
PH UR STRIP: 6.5 PH (ref 5–7)
SOURCE: NORMAL
SP GR UR STRIP: <=1.005 (ref 1–1.03)
SPECIMEN SOURCE: ABNORMAL
UROBILINOGEN UR STRIP-ACNC: 0.2 EU/DL (ref 0.2–1)
WET PREP SPEC: ABNORMAL

## 2018-02-20 PROCEDURE — 87210 SMEAR WET MOUNT SALINE/INK: CPT | Performed by: OBSTETRICS & GYNECOLOGY

## 2018-02-20 PROCEDURE — 99213 OFFICE O/P EST LOW 20 MIN: CPT | Performed by: OBSTETRICS & GYNECOLOGY

## 2018-02-20 PROCEDURE — 81003 URINALYSIS AUTO W/O SCOPE: CPT | Performed by: OBSTETRICS & GYNECOLOGY

## 2018-02-20 RX ORDER — CLOTRIMAZOLE 1 %
CREAM WITH APPLICATOR VAGINAL
Qty: 45 G | Refills: 0 | Status: SHIPPED | OUTPATIENT
Start: 2018-02-20 | End: 2018-03-07

## 2018-02-20 NOTE — NURSING NOTE
"Chief Complaint   Patient presents with     Abdominal Pain   Pt c/o low abd pain x months  Pt c/o vag itching x 2 weeks    Pt attempting pregnancy  Pt states that she has seen a provider in [place] , unsure of clinic, testing was done for fertility.  Pt was advised need for medication or infertility treatment.  Pt elected medication : last dose was 2 months ago.      Here with     Initial BP 96/70  Pulse 76  Wt 183 lb 6.4 oz (83.2 kg)  LMP 2018  BMI 29.6 kg/m2 Estimated body mass index is 29.6 kg/(m^2) as calculated from the following:    Height as of 7/3/17: 5' 6\" (1.676 m).    Weight as of this encounter: 183 lb 6.4 oz (83.2 kg).  BP completed using cuff size: regular        The following HM Due: Vaccinations: flu shot      The following patient reported/Care Every where data was sent to:  P ABSTRACT QUALITY INITIATIVES [57621]  na      n/a              "

## 2018-02-20 NOTE — MR AVS SNAPSHOT
"              After Visit Summary   2018    Tye Morales    MRN: [id number]           Patient Information     Date Of Birth          1975        Visit Information        Provider Department      2018 1:45 PM Tanya Levy MD; [employer] [facility]        Today's Diagnoses     Abdominal pain, generalized    -  1    Vaginal itching           Follow-ups after your visit        Who to contact     If you have questions or need follow up information about today's clinic visit or your schedule please contact [facility] directly at [phone].  Normal or non-critical lab and imaging results will be communicated to you by Edictivehart, letter or phone within 4 business days after the clinic has received the results. If you do not hear from us within 7 days, please contact the clinic through Edictivehart or phone. If you have a critical or abnormal lab result, we will notify you by phone as soon as possible.  Submit refill requests through My-Apps or call your pharmacy and they will forward the refill request to us. Please allow 3 business days for your refill to be completed.          Additional Information About Your Visit        MyChart Information     My-Apps lets you send messages to your doctor, view your test results, renew your prescriptions, schedule appointments and more. To sign up, go to [email or web address] . Click on \"Log in\" on the left side of the screen, which will take you to the Welcome page. Then click on \"Sign up Now\" on the right side of the page.     You will be asked to enter the access code listed below, as well as some personal information. Please follow the directions to create your username and password.     Your access code is: [email or web address]  Expires: 2018 10:36 AM     Your access code will  in 90 days. If you need help or a new code, please call your [facility] or [phone].        Care EveryWhere ID     " This is your Care EveryWhere ID. This could be used by other organizations to access your [facility] medical records  [id number]        Your Vitals Were     Pulse Last Period BMI (Body Mass Index)             76 02/04/2018 29.6 kg/m2          Blood Pressure from Last 3 Encounters:   03/07/18 112/76   02/20/18 96/70   01/19/18 114/78    Weight from Last 3 Encounters:   03/07/18 188 lb 6.4 oz (85.5 kg)   02/20/18 183 lb 6.4 oz (83.2 kg)   01/19/18 191 lb 11.2 oz (87 kg)              We Performed the Following     *UA reflex to Microscopic and Culture ([facility] (except [place] and [place])     Wet prep          Today's Medication Changes          These changes are accurate as of 2/20/18 11:59 PM.  If you have any questions, ask your nurse or doctor.               Start taking these medicines.        Dose/Directions    clotrimazole 1 % cream   Commonly known as:  LOTRIMIN   Used for:  Vaginal itching   Started by:  Tanya Levy MD        One applicator full per vagina nightly x 7 nights.   Quantity:  45 g   Refills:  0            Where to get your medicines      These medications were sent to [facility] [place], [place] [address] [address] [address] [facility] [address]     Phone:  [phone]     clotrimazole 1 % cream                Primary Care Provider Office Phone # Fax #    Rosa Munguia [phone]       XXX RESIGNED XXX  [facility] [address]        Equal Access to Services     [facility] Hospital[facility] : Hadii lizzie scott hadasho Sojohanna, waaxda luqadaha, qaybta kaalmada ilya brian . So [facility] [phone].    ATENCIÓN: Si habla español, tiene a santiago disposición servicios gratuitos de asistencia lingüística. Llame al [phone].    We comply with applicable federal civil rights laws and [place] laws. We do not discriminate on the basis of race, color, national origin, age, disability, sex, sexual  orientation, or gender identity.            Thank you!     Thank you for choosing [facility]  for your care. Our goal is always to provide you with excellent care. Hearing back from our patients is one way we can continue to improve our services. Please take a few minutes to complete the written survey that you may receive in the mail after your visit with us. Thank you!             Your Updated Medication List - Protect others around you: Learn how to safely use, store and throw away your medicines at www.disposemymeds.org.          This list is accurate as of 2/20/18 11:59 PM.  Always use your most recent med list.                   Brand Name Dispense Instructions for use Diagnosis    cholecalciferol 21127 UNITS capsule    VITAMIN D3    40 capsule    1 CAP 2 X A WK FOR 2 WKS, THEN 1 CAP 1ST AND 15TH OF EACH MONTH, FOR VIT D DEFICIENCY    Vitamin D deficiency       clotrimazole 1 % cream    LOTRIMIN    45 g    One applicator full per vagina nightly x 7 nights.    Vaginal itching       Cyanocobalamin 5000 MCG/ML Liqd    B-12 SUPER STRENGTH    2 Bottle    Place 1 mL under the tongue daily FOR VITAMIN B12 SUPPLEMENTATION, PLEASE PLACE UNDER THE TONGUE    Vitamin B12 deficiency (non anemic)       ferrous sulfate 325 (65 FE) MG tablet    IRON    90 tablet    Take 1 tablet (325 mg) by mouth daily (with breakfast) IRON SUPPLEMENT, TAKE WITH 4 OZ OF APPLE JUICE    Iron deficiency       omeprazole 20 MG CR capsule    priLOSEC    90 capsule    TAKE ONE CAPSULE BY MOUTH EVERY MORNING BEFORE BREAKFAST    Gastroesophageal reflux disease without esophagitis       PRENATAL 27-1 MG Tabs     90 tablet    TAKE 1 TABLET BY MOUTH ONCE DAILY    Iron deficiency anemia       pyridOXINE 25 MG tablet    vitamin B-6    90 tablet    Take 1 tablet (25 mg) by mouth daily INDICATION: VITAMIN B6 DEFICIENCY    Vitamin B6 deficiency, Multiple joint pain

## 2018-02-20 NOTE — PROGRESS NOTES
HPI: Tye Morales is a 43 year old female   Obstetric History       T4      L4     SAB0   TAB0   Ectopic0   Multiple0   Live Births4     Patient's last menstrual period was 2018. who presents for evaluation of abdominal pain she presents with an .      Patient reports pelvic pain since approximately  , Which has been slightly worse over the last 3 months.  She describes cramping pain especially in the morning.  She states that these episodes are mostly cyclic, starting just before the onset of her menses, and resolving at the end of her menses.  She states that sometimes she will have episodes of pain when she is not on her menses.   States uses tylenol, ibuprofen as needed.    She denies any changes with voiding, no other precipitating factors.  Denies dyspareunia; is sexually active.   She denies any dysuria, or frequency.     She does note some incontinence of urine.   Does report +constipation; states has bowel movement about 2x/mth.         She also reports vaginal pruritus ×2 weeks, as well as increased vaginal discharge.  She denies any vaginal odor.  She has not used any over-the-counter medications for treatment.        Past Medical History:   Diagnosis Date     Abdominal pain, epigastric      Abnormal Pap smear     colposcopy- has since been normal     Anemia      Constipation      Gastro-oesophageal reflux disease      Helicobacter pylori (H. pylori) 8-     History of colposcopy with cervical biopsy 7/15/11    Start. Scan only- no biopsies     LSIL (low grade squamous intraepithelial lesion) on Pap smear 11     Neutropenia associated with autoimmune disease (H)     Saw hematologist     Past Surgical History:   Procedure Laterality Date      SECTION  2013    Procedure:  SECTION;  primary  section, failure to progress;  Surgeon: Nathan Salinas MD;  Location: [facility] L+D      SECTION N/A 2015    Procedure:   SECTION;  Surgeon: Tanya Levy MD;  Location:  L+D     GYN SURGERY       Family History   Problem Relation Age of Onset     Family History Negative Mother      Family History Negative Father      Social History     Social History     Marital status:      Spouse name: Noe Aponte     Number of children: 2     Years of education: N/A     Occupational History     [employer]     2 days a week      [employer]     Social History Main Topics     Smoking status: Never Smoker     Smokeless tobacco: Never Used     Alcohol use No     Drug use: No     Sexual activity: Yes     Partners: Male     Birth control/ protection: None      Comment: Pt. is currently pregnant     Other Topics Concern      Service No     Blood Transfusions No     Caffeine Concern No     Occupational Exposure No     Hobby Hazards No     Sleep Concern No     Stress Concern No     Weight Concern No     Special Diet No     Back Care Yes     Exercise No     ENCOURAGED     Bike Helmet No     Seat Belt Yes     Self-Exams No     ENCOURAGED     Social History Narrative    Living arrangements - the patient lives with her family.        Review of Systems:   CONSTITUTIONAL:NEGATIVE for fever, chills, change in weight  INTEGUMENTARY/SKIN: NEGATIVE for worrisome rashes, moles or lesions  RESP:NEGATIVE for significant cough or SOB  BREAST: NEGATIVE for masses, tenderness or discharge  CV: NEGATIVE for chest pain, palpitations or peripheral edema  GI: NEGATIVE for abdominal pain, heartburn, or change in bowel habits, nausea, emesis  : negative for, dysuria, vaginal discharge and bleeding  MUSCULOSKELETAL: NEGATIVE for significant arthralgias or myalgia  NEURO: NEGATIVE for weakness, dizziness or paresthesias  PSYCHIATRIC: NEGATIVE for changes in mood or affect       Physical exam:  GENERAL APPEARANCE: healthy, alert and no distress  NECK: no adenopathy, no asymmetry, masses, or scars and thyroid normal to palpation-  ABDOMEN:   soft, nontender, no HSM or masses and bowel sounds normal  PELVIC:   Vulva: normal external female genitalia,   Urethra meatus: normal, non-tender  Vagina: normal vaginal mucosa, rugated, no lesions, normal physiologic discharge.    Cervix: nulliparous cervix, no lesions.    Uterus: Normal contour, size, position; non-tender  Adnexa: No adnexal masses palpated, non-tender  Perineum: normal, no lesions, intact  Anus: normal, no lesions, hemorrhoids            Assessment/Plan:  (R10.84) Abdominal pain, generalized  (primary encounter diagnosis)  Comment:  Normal exam, non-tender.    Normal pelvic ultrasound 6/20/17.    Unclear etiology; some cyclic component, although not entirely.   May be consistent with dysmenorrhea.   May also be functional; patient with significant constipation.    Recommendations for increase of dietary fiber, hydration, fiber supplements, stool softeners, occasional laxative as needed, regular bowel hygiene discussed.     May benefit from pain diary.   Will check UA today for urinary symptoms.      Plan: *UA reflex to Microscopic and Culture ([place]         and [facility] (except [place] and         [place])             (L29.8) Vaginal itching  Comment:    Plan: Wet prep, DISCONTINUED: clotrimazole (LOTRIMIN)        1 % cream               This encounter was dictated using voice-recognition software; undetected errors may be present.      Tanya Levy M.D.

## 2018-03-07 ENCOUNTER — OFFICE VISIT (OUTPATIENT)
Dept: INTERNAL MEDICINE | Facility: CLINIC | Age: 43
End: 2018-03-07
Payer: COMMERCIAL

## 2018-03-07 VITALS
OXYGEN SATURATION: 97 % | HEART RATE: 90 BPM | TEMPERATURE: 98.1 F | WEIGHT: 188.4 LBS | DIASTOLIC BLOOD PRESSURE: 76 MMHG | SYSTOLIC BLOOD PRESSURE: 112 MMHG | RESPIRATION RATE: 24 BRPM | BODY MASS INDEX: 30.41 KG/M2

## 2018-03-07 DIAGNOSIS — R07.0 THROAT PAIN: ICD-10-CM

## 2018-03-07 DIAGNOSIS — G43.109 MIGRAINE WITH AURA AND WITHOUT STATUS MIGRAINOSUS, NOT INTRACTABLE: ICD-10-CM

## 2018-03-07 DIAGNOSIS — E61.1 IRON DEFICIENCY: ICD-10-CM

## 2018-03-07 DIAGNOSIS — K21.9 GASTROESOPHAGEAL REFLUX DISEASE WITHOUT ESOPHAGITIS: ICD-10-CM

## 2018-03-07 DIAGNOSIS — E55.9 VITAMIN D DEFICIENCY: ICD-10-CM

## 2018-03-07 DIAGNOSIS — Z76.89 ENCOUNTER TO ESTABLISH CARE: Primary | ICD-10-CM

## 2018-03-07 LAB
ERYTHROCYTE [DISTWIDTH] IN BLOOD BY AUTOMATED COUNT: 13.3 % (ref 10–15)
HCT VFR BLD AUTO: 38.9 % (ref 35–47)
HGB BLD-MCNC: 12.6 G/DL (ref 11.7–15.7)
MCH RBC QN AUTO: 32.1 PG (ref 26.5–33)
MCHC RBC AUTO-ENTMCNC: 32.4 G/DL (ref 31.5–36.5)
MCV RBC AUTO: 99 FL (ref 78–100)
PLATELET # BLD AUTO: 229 10E9/L (ref 150–450)
RBC # BLD AUTO: 3.92 10E12/L (ref 3.8–5.2)
WBC # BLD AUTO: 5.5 10E9/L (ref 4–11)

## 2018-03-07 PROCEDURE — 99214 OFFICE O/P EST MOD 30 MIN: CPT | Performed by: PHYSICIAN ASSISTANT

## 2018-03-07 PROCEDURE — 85027 COMPLETE CBC AUTOMATED: CPT | Performed by: PHYSICIAN ASSISTANT

## 2018-03-07 PROCEDURE — 83540 ASSAY OF IRON: CPT | Performed by: PHYSICIAN ASSISTANT

## 2018-03-07 PROCEDURE — 83550 IRON BINDING TEST: CPT | Performed by: PHYSICIAN ASSISTANT

## 2018-03-07 PROCEDURE — 36415 COLL VENOUS BLD VENIPUNCTURE: CPT | Performed by: PHYSICIAN ASSISTANT

## 2018-03-07 PROCEDURE — 82306 VITAMIN D 25 HYDROXY: CPT | Performed by: PHYSICIAN ASSISTANT

## 2018-03-07 PROCEDURE — 82728 ASSAY OF FERRITIN: CPT | Performed by: PHYSICIAN ASSISTANT

## 2018-03-07 RX ORDER — ACETAMINOPHEN 500 MG
1000 TABLET ORAL EVERY 6 HOURS PRN
Qty: 50 TABLET | Refills: 0 | Status: SHIPPED | OUTPATIENT
Start: 2018-03-07 | End: 2019-09-10

## 2018-03-07 RX ORDER — IBUPROFEN 600 MG/1
600 TABLET, FILM COATED ORAL EVERY 8 HOURS PRN
Qty: 30 TABLET | Refills: 1 | Status: SHIPPED | OUTPATIENT
Start: 2018-03-07 | End: 2018-10-08

## 2018-03-07 ASSESSMENT — PAIN SCALES - GENERAL: PAINLEVEL: NO PAIN (0)

## 2018-03-07 NOTE — PROGRESS NOTES
"  SUBJECTIVE:   Tye Morales is a 43 year old female who presents to clinic today for the following health issues:     is present     Chief Complaint   Patient presents with     Establish Care     Refill Request     Migraine headache:   - pt requests tylenol refill   - pt reports she takes this as needed for headaches  - headache frequency: everyday to several times a week   - duration: medication relieves it within a half hour   - onset: pt notes since she was a little girl   - overall headaches have stayed the same without worsening symptoms   - location: varies from side of the head to sometimes total head   - description: \"regular pain\"  - accompanying symptoms: sensitivity to lights, with visual aura   - denies: numbness, tingling, weakness, nausea, and vomiting     Vitamin deficiencies:  - vitamin D: currently on 50, 000 iu every two weeks with normal lab results as of August 2017  - vitamin B12: on daily supplement with elevated lab results as of August 2017   - vitamin B6, normal lab results as of 2015  - iron deficiency: last checked August 2016 with normal results, pt agrees to eating red meat and green leafy vegetables and denies heavy menses   - pt reports she does feel some fatigue symptoms     Acid reflux:  - pt takes omeprazole 20 mg as needed for acid reflux symptoms   - pt notes it works when she takes it   - pt is wondering about risk with medication use     Ibuprofen refill   - pt requests ibuprofen refill to have on hand as needed for pain   - she denies current symptoms of pain    Health Maintenance:  - pap smear: due in June   - mammogram: due in August   - immunizations: UTD     Problem list and histories reviewed & adjusted, as indicated.  Additional history: as documented      Reviewed and updated as needed this visit by clinical staff  Tobacco  Allergies  Med Hx  Surg Hx  Fam Hx  Soc Hx      Reviewed and updated as needed this visit by Provider  Tobacco  Med Hx  Surg " Hx  Fam Hx  Soc Hx        OBJECTIVE:     /76  Pulse 90  Temp 98.1  F (36.7  C) (Oral)  Resp 24  Wt 188 lb 6.4 oz (85.5 kg)  SpO2 97%  Breastfeeding? No  BMI 30.41 kg/m2  Body mass index is 30.41 kg/(m^2).  GENERAL: healthy, alert and no distress  EYES: Eyes grossly normal to inspection, PERRL and conjunctivae and sclerae normal  HENT: ear canals and TM's normal, nose and mouth without ulcers or lesions  NECK: no adenopathy, no asymmetry, masses, or scars and thyroid normal to palpation  RESP: lungs clear to auscultation - no rales, rhonchi or wheezes  CV: regular rates and rhythm, normal S1 S2, no S3 or S4, no murmur, click or rub and no peripheral edema  ABDOMEN: soft, nontender, no hepatosplenomegaly, no masses and bowel sounds normal      ASSESSMENT/PLAN:     1. Encounter to [facility] care  - reviewed health maintenance  - follow up in June for pap smear    - DC B vitamins     2. Vitamin D deficiency  - discussed DC and pt was very hesitant, will treat based on lab results   - Vitamin D Deficiency  - Calcium Carb-Cholecalciferol (CALCIUM 1000 + D) 1000-800 MG-UNIT TABS; Take 1 tablet by mouth daily TAKE WITH FOOD, FOR BONE HEALTH AND LOW VITAMIN D ([facility])  Dispense: 100 tablet; Refill: PRN    3. Iron deficiency  - will treat based on results   - Iron and iron binding capacity  - Ferritin  - CBC with platelets    4. Gastroesophageal reflux disease without esophagitis  - reviewed side effects and risk of omeprazole, will do a trial of zantac   - ranitidine (ZANTAC) 150 MG tablet; Take 1 tablet (150 mg) by mouth 2 times daily  Dispense: 60 tablet; Refill: 1  - pt to notify clinic if symptoms worsen     6. Migraine with aura and without status migrainosus, not intractable  - reviewed options for trial of prophylactic medication, pt declined, but will follow up if she wishes to start this   - acetaminophen (TYLENOL) 500 MG tablet; Take 2 tablets (1,000 mg) by mouth every 6 hours  as needed for mild pain  Dispense: 50 tablet; Refill: 0    Pt agreed to above plan and all questions are answered     Dominga Jeff PA-C  [facility]

## 2018-03-07 NOTE — MR AVS SNAPSHOT
"              After Visit Summary   3/7/2018    Tye Morales    MRN: [id number]           Patient Information     Date Of Birth          1975        Visit Information        Provider Department      3/7/2018 4:30 PM Dominga Jeff PA-C; [employer] [facility]        Today's Diagnoses     Vitamin D deficiency    -  1    Iron deficiency        Throat pain        Gastroesophageal reflux disease without esophagitis        Chest wall pain        Pain of left upper arm          Care Instructions    Pap smear in June of this year               Follow-ups after your visit        Who to contact     If you have questions or need follow up information about today's clinic visit or your schedule please contact [facility] directly at [phone].  Normal or non-critical lab and imaging results will be communicated to you by Sulmaqhart, letter or phone within 4 business days after the clinic has received the results. If you do not hear from us within 7 days, please contact the clinic through Sulmaqhart or phone. If you have a critical or abnormal lab result, we will notify you by phone as soon as possible.  Submit refill requests through Vidyo or call your pharmacy and they will forward the refill request to us. Please allow 3 business days for your refill to be completed.          Additional Information About Your Visit        Sulmaqhart Information     Vidyo lets you send messages to your doctor, view your test results, renew your prescriptions, schedule appointments and more. To sign up, go to [email or web address] . Click on \"Log in\" on the left side of the screen, which will take you to the Welcome page. Then click on \"Sign up Now\" on the right side of the page.     You will be asked to enter the access code listed below, as well as some personal information. Please follow the directions to create your username and password.     Your access code " is: [email or web address]  Expires: 2018  9:36 AM     Your access code will  in 90 days. If you need help or a new code, please call your [facility] clinic or [phone].        Care EveryWhere ID     This is your Care EveryWhere ID. This could be used by other organizations to access your [facility] medical records  [id number]        Your Vitals Were     Pulse Temperature Respirations Pulse Oximetry Breastfeeding? BMI (Body Mass Index)    90 98.1  F (36.7  C) (Oral) 24 97% No 30.41 kg/m2       Blood Pressure from Last 3 Encounters:   18 112/76   18 96/70   18 114/78    Weight from Last 3 Encounters:   18 188 lb 6.4 oz (85.5 kg)   18 183 lb 6.4 oz (83.2 kg)   18 191 lb 11.2 oz (87 kg)              We Performed the Following     CBC with platelets     Ferritin     Iron and iron binding capacity     Vitamin D Deficiency          Today's Medication Changes          These changes are accurate as of 3/7/18  5:08 PM.  If you have any questions, ask your nurse or doctor.               Start taking these medicines.        Dose/Directions    ranitidine 150 MG tablet   Commonly known as:  ZANTAC   Used for:  Gastroesophageal reflux disease without esophagitis   Started by:  Dominga Jeff PA-C        Dose:  150 mg   Take 1 tablet (150 mg) by mouth 2 times daily   Quantity:  60 tablet   Refills:  1         Stop taking these medicines if you haven't already. Please contact your care team if you have questions.     Cyanocobalamin 5000 MCG/ML Liqd   Commonly known as:  B-12 SUPER STRENGTH   Stopped by:  Dominga Jeff PA-C           omeprazole 20 MG CR capsule   Commonly known as:  priLOSEC   Stopped by:  Dominga Jeff PA-C           PRENATAL 27-1 MG Tabs   Stopped by:  Dominga Jeff PA-C           pyridOXINE 25 MG tablet   Commonly known as:  vitamin B-6   Stopped by:  Dominga Jeff PA-C                Where to get your medicines      These medications were sent to  [place], [place] - [address]  [address] [facility] [address]     Phone:  [phone]     acetaminophen 500 MG tablet    Calcium Carb-Cholecalciferol 1000-800 MG-UNIT Tabs    ibuprofen 600 MG tablet    ranitidine 150 MG tablet                Primary Care Provider Office Phone # Fax #    Rosa Munguia [phone]       XXX RESIGNED XXX  [facility] [address]        Equal Access to Services     JAKOB GOMEZ : Hadii aad ku hadasho Soomaali, waaxda luqadaha, qaybta kaalmada adeegyada, waxay idiin hayaan adeeg kharamarquise wright . So [facility] [phone].    ATENCIÓN: Si habla español, tiene a santiago disposición servicios gratuitos de asistencia lingüística. [facility] [phone].    We comply with applicable federal civil rights laws and [place] laws. We do not discriminate on the basis of race, color, national origin, age, disability, sex, sexual orientation, or gender identity.            Thank you!     Thank you for choosing [facility]  for your care. Our goal is always to provide you with excellent care. Hearing back from our patients is one way we can continue to improve our services. Please take a few minutes to complete the written survey that you may receive in the mail after your visit with us. Thank you!             Your Updated Medication List - Protect others around you: Learn how to safely use, store and throw away your medicines at www.disposemymeds.org.          This list is accurate as of 3/7/18  5:08 PM.  Always use your most recent med list.                   Brand Name Dispense Instructions for use Diagnosis    acetaminophen 500 MG tablet    TYLENOL    50 tablet    Take 2 tablets (1,000 mg) by mouth every 6 hours as needed for mild pain    Throat pain       Calcium Carb-Cholecalciferol 1000-800 MG-UNIT Tabs    CALCIUM 1000 + D    100 tablet    Take 1 tablet by mouth daily TAKE WITH FOOD, FOR BONE HEALTH AND LOW VITAMIN D  ([facility])    Vitamin D deficiency       cholecalciferol 07758 UNITS capsule    VITAMIN D3    40 capsule    1 CAP 2 X A WK FOR 2 WKS, THEN 1 CAP 1ST AND 15TH OF EACH MONTH, FOR VIT D DEFICIENCY    Vitamin D deficiency       ferrous sulfate 325 (65 FE) MG tablet    IRON    90 tablet    Take 1 tablet (325 mg) by mouth daily (with breakfast) IRON SUPPLEMENT, TAKE WITH 4 OZ OF APPLE JUICE    Iron deficiency       ibuprofen 600 MG tablet    ADVIL/MOTRIN    30 tablet    Take 1 tablet (600 mg) by mouth every 8 hours as needed for moderate pain MUST TAKE WITH FOOD    Chest wall pain, Pain of left upper arm       ranitidine 150 MG tablet    ZANTAC    60 tablet    Take 1 tablet (150 mg) by mouth 2 times daily    Gastroesophageal reflux disease without esophagitis

## 2018-03-08 PROBLEM — G43.109 MIGRAINE WITH AURA AND WITHOUT STATUS MIGRAINOSUS, NOT INTRACTABLE: Status: ACTIVE | Noted: 2018-03-08

## 2018-03-08 LAB
DEPRECATED CALCIDIOL+CALCIFEROL SERPL-MC: 36 UG/L (ref 20–75)
FERRITIN SERPL-MCNC: 34 NG/ML (ref 12–150)
IRON SATN MFR SERPL: 21 % (ref 15–46)
IRON SERPL-MCNC: 54 UG/DL (ref 35–180)
TIBC SERPL-MCNC: 262 UG/DL (ref 240–430)

## 2018-06-19 ENCOUNTER — TELEPHONE (OUTPATIENT)
Dept: BEHAVIORAL HEALTH | Facility: CLINIC | Age: 43
End: 2018-06-19

## 2018-07-16 ENCOUNTER — OFFICE VISIT (OUTPATIENT)
Dept: URGENT CARE | Facility: URGENT CARE | Age: 43
End: 2018-07-16
Payer: COMMERCIAL

## 2018-07-16 VITALS
SYSTOLIC BLOOD PRESSURE: 100 MMHG | OXYGEN SATURATION: 96 % | WEIGHT: 182.3 LBS | DIASTOLIC BLOOD PRESSURE: 82 MMHG | BODY MASS INDEX: 29.42 KG/M2 | TEMPERATURE: 97.3 F | RESPIRATION RATE: 20 BRPM | HEART RATE: 74 BPM

## 2018-07-16 DIAGNOSIS — R22.0 RIGHT FACIAL SWELLING: ICD-10-CM

## 2018-07-16 DIAGNOSIS — K08.89 PAIN IN A TOOTH OR TEETH: Primary | ICD-10-CM

## 2018-07-16 PROCEDURE — 99214 OFFICE O/P EST MOD 30 MIN: CPT | Performed by: PHYSICIAN ASSISTANT

## 2018-07-16 RX ORDER — HYDROCODONE BITARTRATE AND ACETAMINOPHEN 5; 325 MG/1; MG/1
1 TABLET ORAL EVERY 6 HOURS PRN
Qty: 10 TABLET | Refills: 0 | Status: SHIPPED | OUTPATIENT
Start: 2018-07-16 | End: 2018-07-18

## 2018-07-16 NOTE — MR AVS SNAPSHOT
"              After Visit Summary   7/16/2018    Tye Morales    MRN: [id number]           Patient Information     Date Of Birth          1975        Visit Information        Provider Department      7/16/2018 3:05 PM Jimmy Pathak PA-C [facility]        Today's Diagnoses     Pain in a tooth or teeth    -  1    Right facial swelling           Follow-ups after your visit        Your next 10 appointments already scheduled     Jul 18, 2018  9:00 AM CDT   [facility]    [address]  [facility] [address]   [phone]              Who to contact     If you have questions or need follow up information about today's clinic visit or your schedule please contact [facility] directly at [phone].  Normal or non-critical lab and imaging results will be communicated to you by MyChart, letter or phone within 4 business days after the clinic has received the results. If you do not hear from us within 7 days, please contact the clinic through MyChart or phone. If you have a critical or abnormal lab result, we will notify you by phone as soon as possible.  Submit refill requests through [employer] or call your pharmacy and they will forward the refill request to us. Please allow 3 business days for your refill to be completed.          Additional Information About Your Visit        MyChart Information     [employer] lets you send messages to your doctor, view your test results, renew your prescriptions, schedule appointments and more. To sign up, go to [email or web address] . Click on \"Log in\" on the left side of the screen, which will take you to the Welcome page. Then click on \"Sign up Now\" on the right side of the page.     You will be asked to enter the access code listed below, as well as some personal information. Please follow the directions to create your " username and password.     Your access code is: [email or web address]  Expires: 10/14/2018  3:47 PM     Your access code will  in 90 days. If you need help or a new code, please call your [facility] or [phone].        Care EveryWhere ID     This is your Care EveryWhere ID. This could be used by other organizations to access your [place] medical records  [id number]        Your Vitals Were     Pulse Temperature Respirations Pulse Oximetry BMI (Body Mass Index)       74 97.3  F (36.3  C) (Oral) 20 96% 29.42 kg/m2        Blood Pressure from Last 3 Encounters:   18 100/82   18 112/76   18 96/70    Weight from Last 3 Encounters:   18 182 lb 4.8 oz (82.7 kg)   18 188 lb 6.4 oz (85.5 kg)   18 183 lb 6.4 oz (83.2 kg)              Today, you had the following     No orders found for display         Today's Medication Changes          These changes are accurate as of 18  3:47 PM.  If you have any questions, ask your nurse or doctor.               Start taking these medicines.        Dose/Directions    amoxicillin-clavulanate 875-125 MG per tablet   Commonly known as:  AUGMENTIN   Used for:  Pain in a tooth or teeth, Right facial swelling   Started by:  Jimmy Pathak PA-C        Dose:  1 tablet   Take 1 tablet by mouth 2 times daily   Quantity:  20 tablet   Refills:  0       HYDROcodone-acetaminophen 5-325 MG per tablet   Commonly known as:  NORCO   Used for:  Pain in a tooth or teeth   Started by:  Jimmy Pathak PA-C        Dose:  1 tablet   Take 1 tablet by mouth every 6 hours as needed for severe pain   Quantity:  10 tablet   Refills:  0       magic mouthwash suspension   Commonly known as:  ENTER INGREDIENTS IN COMMENTS   Used for:  Pain in a tooth or teeth   Started by:  Jimmy Pathak PA-C        Dose:  5-10 mL   Swish and spit 5-10 mLs in mouth every 6 hours as needed compound 4 grams carafate susp in 30 ml Benadryl (12.5 mg/5 ml), 60 ml Maalox and 30 ml Viscous  Lidocaine   Quantity:  160 mL   Refills:  0            Where to get your medicines      These medications were sent to [facility] [address], [facility] [address]     Phone:  [phone]     amoxicillin-clavulanate 875-125 MG per tablet         Some of these will need a paper prescription and others can be bought over the counter.  Ask your nurse if you have questions.     Bring a paper prescription for each of these medications     HYDROcodone-acetaminophen 5-325 MG per tablet    magic mouthwash suspension               Information about OPIOIDS     PRESCRIPTION OPIOIDS: WHAT YOU NEED TO KNOW   We gave you an opioid (narcotic) pain medicine. It is important to manage your pain, but opioids are not always the best choice. You should first try all the other options your care team gave you. Take this medicine for as short a time (and as few doses) as possible.     These medicines have risks:    DO NOT drive when on new or higher doses of pain medicine. These medicines can affect your alertness and reaction times, and you could be arrested for driving under the influence (DUI). If you need to use opioids long-term, talk to your care team about driving.    DO NOT operate heave machinery    DO NOT do any other dangerous activities while taking these medicines.     DO NOT drink any alcohol while taking these medicines.      If the opioid prescribed includes acetaminophen, DO NOT take with any other medicines that contain acetaminophen. Read all labels carefully. Look for the word  acetaminophen  or  Tylenol.  Ask your pharmacist if you have questions or are unsure.    You can get addicted to pain medicines, especially if you have a history of addiction (chemical, alcohol or substance dependence). Talk to your care team about ways to reduce this risk.    Store your pills in a secure place, locked if possible. We will not replace any lost or stolen medicine. If  you don t finish your medicine, please throw away (dispose) as directed by your pharmacist. The [place] Pollution Control Agency has more information about safe disposal: https://www.pca.[facility].mn.us/living-green/managing-unwanted-medications.     All opioids tend to cause constipation. Drink plenty of water and eat foods that have a lot of fiber, such as fruits, vegetables, prune juice, apple juice and high-fiber cereal. Take a laxative (Miralax, milk of magnesia, Colace, Senna) if you don t move your bowels at least every other day.          Primary Care Provider Office Phone # Fax #    Donita Engel [phone]       [address]  [facility] [address]        Equal Access to Services     JAKOB GOMEZ : Hadii aad ku hadasho Sojacekali, waaxda luqadaha, qaybta kaalmada adeegyada, ilya wright . So [facility] [phone].    ATENCIÓN: Si habla español, tiene a santiago disposición servicios gratuitos de asistencia lingüística. Llame al [phone].    We comply with applicable federal civil rights laws and [place] laws. We do not discriminate on the basis of race, color, national origin, age, disability, sex, sexual orientation, or gender identity.            Thank you!     Thank you for choosing [facility]  for your care. Our goal is always to provide you with excellent care. Hearing back from our patients is one way we can continue to improve our services. Please take a few minutes to complete the written survey that you may receive in the mail after your visit with us. Thank you!             Your Updated Medication List - Protect others around you: Learn how to safely use, store and throw away your medicines at www.disposemymeds.org.          This list is accurate as of 7/16/18  3:47 PM.  Always use your most recent med list.                   Brand Name Dispense Instructions for use Diagnosis    acetaminophen 500 MG tablet    TYLENOL    50  tablet    Take 2 tablets (1,000 mg) by mouth every 6 hours as needed for mild pain    Throat pain       amoxicillin-clavulanate 875-125 MG per tablet    AUGMENTIN    20 tablet    Take 1 tablet by mouth 2 times daily    Pain in a tooth or teeth, Right facial swelling       Calcium Carb-Cholecalciferol 1000-800 MG-UNIT Tabs    CALCIUM 1000 + D    100 tablet    Take 1 tablet by mouth daily TAKE WITH FOOD, FOR BONE HEALTH AND LOW VITAMIN D ([facility])    Vitamin D deficiency       cholecalciferol 84895 units capsule    VITAMIN D3    40 capsule    1 CAP 2 X A WK FOR 2 WKS, THEN 1 CAP 1ST AND 15TH OF EACH MONTH, FOR VIT D DEFICIENCY    Vitamin D deficiency       ferrous sulfate 325 (65 Fe) MG tablet    IRON    90 tablet    Take 1 tablet (325 mg) by mouth daily (with breakfast) IRON SUPPLEMENT, TAKE WITH 4 OZ OF APPLE JUICE    Iron deficiency       HYDROcodone-acetaminophen 5-325 MG per tablet    NORCO    10 tablet    Take 1 tablet by mouth every 6 hours as needed for severe pain    Pain in a tooth or teeth       ibuprofen 600 MG tablet    ADVIL/MOTRIN    30 tablet    Take 1 tablet (600 mg) by mouth every 8 hours as needed for moderate pain MUST TAKE WITH FOOD        magic mouthwash suspension    ENTER INGREDIENTS IN COMMENTS    160 mL    Swish and spit 5-10 mLs in mouth every 6 hours as needed compound 4 grams carafate susp in 30 ml Benadryl (12.5 mg/5 ml), 60 ml Maalox and 30 ml Viscous Lidocaine    Pain in a tooth or teeth       ranitidine 150 MG tablet    ZANTAC    60 tablet    Take 1 tablet (150 mg) by mouth 2 times daily    Gastroesophageal reflux disease without esophagitis

## 2018-07-16 NOTE — PROGRESS NOTES
SUBJECTIVE:  Tye Morales is a 43 year old female with a chief complaint of right lower tooth pain with facial swelling.  Onset of symptoms was 2 day(s) ago.    Course of illness: still present.  Severity mild and moderate  Current and Associated symptoms: right lower side facial swelling and tenderness  Treatment measures tried include OTC medications.  Predisposing factors include recent tooth extraction.    Past Medical History:   Diagnosis Date     Abdominal pain, epigastric      Abnormal Pap smear     colposcopy- has since been normal     Anemia      Constipation      Gastro-oesophageal reflux disease      Helicobacter pylori (H. pylori) 8-2007     History of colposcopy with cervical biopsy 7/15/11    [place]. Scan only- no biopsies     LSIL (low grade squamous intraepithelial lesion) on Pap smear 6/17/11     Neutropenia associated with autoimmune disease (H) 2007    Saw hematologist     Allergies   Allergen Reactions     Nkda [No Known Drug Allergies]      Social History   Substance Use Topics     Smoking status: Never Smoker     Smokeless tobacco: Never Used     Alcohol use No       ROS:  CONSTITUTIONAL:NEGATIVE for fever, chills, change in weight  INTEGUMENTARY/SKIN: POSITIVE for right side facial swelling  ENT/MOUTH: Positive for right lower jaw and tooth pain  RESP:NEGATIVE for significant cough or SOB  CV: NEGATIVE for chest pain, palpitations or peripheral edema  NEURO: NEGATIVE for weakness, dizziness or paresthesias    OBJECTIVE:   /82  Pulse 74  Temp 97.3  F (36.3  C) (Oral)  Resp 20  Wt 182 lb 4.8 oz (82.7 kg)  SpO2 96%  BMI 29.42 kg/m2  GENERAL APPEARANCE: healthy, alert and no distress  EYES: EOMI,  PERRL, conjunctiva clear  HENT: TM's normal bilaterally and right lower jaw pain, swelling, tooth pain  NECK: supple, non-tender to palpation, no adenopathy noted  RESP: lungs clear to auscultation - no rales, rhonchi or wheezes  CV: regular rates and rhythm, normal S1 S2, no murmur  noted  ABDOMEN:  soft, nontender, no HSM or masses and bowel sounds normal  SKIN: Positive for right lower jaw swellin    ASSESSMENT:      Pain in a tooth or teeth  Right facial swelling    PLAN:   See orders in epic.   Symptomatic treat with gargles, lozenges, and OTC analgesic as needed. Follow-up with primary clinic if not improving.  Orders Placed This Encounter     amoxicillin-clavulanate (AUGMENTIN) 875-125 MG per tablet     magic mouthwash (ENTER INGREDIENTS IN COMMENTS) suspension     HYDROcodone-acetaminophen (NORCO) 5-325 MG per tablet       Advised to follow up with dentistry

## 2018-07-18 ENCOUNTER — OFFICE VISIT (OUTPATIENT)
Dept: INTERNAL MEDICINE | Facility: CLINIC | Age: 43
End: 2018-07-18
Payer: COMMERCIAL

## 2018-07-18 VITALS
WEIGHT: 183.4 LBS | SYSTOLIC BLOOD PRESSURE: 134 MMHG | HEART RATE: 91 BPM | BODY MASS INDEX: 29.47 KG/M2 | DIASTOLIC BLOOD PRESSURE: 87 MMHG | TEMPERATURE: 98.4 F | RESPIRATION RATE: 16 BRPM | OXYGEN SATURATION: 99 % | HEIGHT: 66 IN

## 2018-07-18 DIAGNOSIS — R22.0 SWOLLEN GUMS: ICD-10-CM

## 2018-07-18 DIAGNOSIS — N97.9 FEMALE INFERTILITY: Primary | ICD-10-CM

## 2018-07-18 DIAGNOSIS — E61.1 IRON DEFICIENCY: ICD-10-CM

## 2018-07-18 DIAGNOSIS — Z12.31 ENCOUNTER FOR SCREENING MAMMOGRAM FOR BREAST CANCER: ICD-10-CM

## 2018-07-18 DIAGNOSIS — E55.9 VITAMIN D DEFICIENCY: ICD-10-CM

## 2018-07-18 LAB
BASOPHILS # BLD AUTO: 0 10E9/L (ref 0–0.2)
BASOPHILS NFR BLD AUTO: 0.4 %
DIFFERENTIAL METHOD BLD: ABNORMAL
EOSINOPHIL # BLD AUTO: 0.1 10E9/L (ref 0–0.7)
EOSINOPHIL NFR BLD AUTO: 2.7 %
ERYTHROCYTE [DISTWIDTH] IN BLOOD BY AUTOMATED COUNT: 13 % (ref 10–15)
FSH SERPL-ACNC: 8.4 IU/L
HCT VFR BLD AUTO: 39.9 % (ref 35–47)
HGB BLD-MCNC: 13 G/DL (ref 11.7–15.7)
LH SERPL-ACNC: 49 IU/L
LYMPHOCYTES # BLD AUTO: 1.1 10E9/L (ref 0.8–5.3)
LYMPHOCYTES NFR BLD AUTO: 42 %
MCH RBC QN AUTO: 32.1 PG (ref 26.5–33)
MCHC RBC AUTO-ENTMCNC: 32.6 G/DL (ref 31.5–36.5)
MCV RBC AUTO: 99 FL (ref 78–100)
MONOCYTES # BLD AUTO: 0.3 10E9/L (ref 0–1.3)
MONOCYTES NFR BLD AUTO: 11.4 %
NEUTROPHILS # BLD AUTO: 1.1 10E9/L (ref 1.6–8.3)
NEUTROPHILS NFR BLD AUTO: 43.5 %
PLATELET # BLD AUTO: 186 10E9/L (ref 150–450)
PROLACTIN SERPL-MCNC: 10 UG/L (ref 3–27)
RBC # BLD AUTO: 4.05 10E12/L (ref 3.8–5.2)
VIT B12 SERPL-MCNC: 750 PG/ML (ref 193–986)
WBC # BLD AUTO: 2.6 10E9/L (ref 4–11)

## 2018-07-18 PROCEDURE — 99396 PREV VISIT EST AGE 40-64: CPT | Performed by: INTERNAL MEDICINE

## 2018-07-18 PROCEDURE — 82306 VITAMIN D 25 HYDROXY: CPT | Performed by: INTERNAL MEDICINE

## 2018-07-18 PROCEDURE — 82607 VITAMIN B-12: CPT | Performed by: INTERNAL MEDICINE

## 2018-07-18 PROCEDURE — 36415 COLL VENOUS BLD VENIPUNCTURE: CPT | Performed by: INTERNAL MEDICINE

## 2018-07-18 PROCEDURE — 85025 COMPLETE CBC W/AUTO DIFF WBC: CPT | Performed by: INTERNAL MEDICINE

## 2018-07-18 PROCEDURE — 83001 ASSAY OF GONADOTROPIN (FSH): CPT | Performed by: INTERNAL MEDICINE

## 2018-07-18 PROCEDURE — 84443 ASSAY THYROID STIM HORMONE: CPT | Performed by: INTERNAL MEDICINE

## 2018-07-18 PROCEDURE — 83002 ASSAY OF GONADOTROPIN (LH): CPT | Performed by: INTERNAL MEDICINE

## 2018-07-18 PROCEDURE — 82728 ASSAY OF FERRITIN: CPT | Performed by: INTERNAL MEDICINE

## 2018-07-18 PROCEDURE — 83550 IRON BINDING TEST: CPT | Performed by: INTERNAL MEDICINE

## 2018-07-18 PROCEDURE — 84146 ASSAY OF PROLACTIN: CPT | Performed by: INTERNAL MEDICINE

## 2018-07-18 PROCEDURE — 83540 ASSAY OF IRON: CPT | Performed by: INTERNAL MEDICINE

## 2018-07-18 RX ORDER — AMOXICILLIN AND CLAVULANATE POTASSIUM 500; 125 MG/1; MG/1
1 TABLET, FILM COATED ORAL 2 TIMES DAILY
Qty: 20 TABLET | Refills: 0 | Status: SHIPPED | OUTPATIENT
Start: 2018-07-18 | End: 2018-10-08

## 2018-07-18 ASSESSMENT — ANXIETY QUESTIONNAIRES
3. WORRYING TOO MUCH ABOUT DIFFERENT THINGS: NOT AT ALL
GAD7 TOTAL SCORE: 0
1. FEELING NERVOUS, ANXIOUS, OR ON EDGE: NOT AT ALL
6. BECOMING EASILY ANNOYED OR IRRITABLE: NOT AT ALL
5. BEING SO RESTLESS THAT IT IS HARD TO SIT STILL: NOT AT ALL
2. NOT BEING ABLE TO STOP OR CONTROL WORRYING: NOT AT ALL
IF YOU CHECKED OFF ANY PROBLEMS ON THIS QUESTIONNAIRE, HOW DIFFICULT HAVE THESE PROBLEMS MADE IT FOR YOU TO DO YOUR WORK, TAKE CARE OF THINGS AT HOME, OR GET ALONG WITH OTHER PEOPLE: NOT DIFFICULT AT ALL
7. FEELING AFRAID AS IF SOMETHING AWFUL MIGHT HAPPEN: NOT AT ALL

## 2018-07-18 ASSESSMENT — PATIENT HEALTH QUESTIONNAIRE - PHQ9: 5. POOR APPETITE OR OVEREATING: NOT AT ALL

## 2018-07-18 NOTE — PROGRESS NOTES
SUBJECTIVE:   CC: Tye Morales is an 43 year old woman who presents for preventive health visit.     Answers for HPI/ROS submitted by the patient on 2018   Annual Exam:  Getting at least 3 servings of Calcium per day:: NO  Bi-annual eye exam:: NO  Dental care twice a year:: Yes  Sleep apnea or symptoms of sleep apnea:: None  Diet:: Regular (no restrictions)  Taking medications regularly:: Yes  Medication side effects:: None  Additional concerns today:: No  PHQ-2 Score: 0      Her right eye is irritate.      Patient reports infertility over last several years.  She also started having irregular periods.    History of anemia and vitamin D def    She recently had root canal, was seen in UC for worsening tooth pain and was given augmentin to take      Today's PHQ-2 Score:   PHQ-2 (  [employer]) 2018 3/7/2018   Q1: Little interest or pleasure in doing things 0 0   Q2: Feeling down, depressed or hopeless 0 0   PHQ-2 Score 0 0   Q1: Little interest or pleasure in doing things Not at all -   Q2: Feeling down, depressed or hopeless Not at all -   PHQ-2 Score 0 -       Abuse: Current or Past(Physical, Sexual or Emotional)- No  Do you feel safe in your environment - Yes    Social History   Substance Use Topics     Smoking status: Never Smoker     Smokeless tobacco: Never Used     Alcohol use No     If you drink alcohol do you typically have >3 drinks per day or >7 drinks per week? No                     Reviewed orders with patient.  Reviewed health maintenance and updated orders accordingly - Yes  Patient Active Problem List   Diagnosis     External hemorrhoids     Vitamin D deficiency     LSIL (low grade squamous intraepithelial lesion) on Pap smear     GERD (gastroesophageal reflux disease)     Iron deficiency     Allergic rhinitis, unspecified allergic rhinitis type     Migraine with aura and without status migrainosus, not intractable     Past Surgical History:   Procedure Laterality Date       SECTION  2013    Procedure:  SECTION;  primary  section, failure to progress;  Surgeon: Nathan Salinas MD;  Location: [facility]      SECTION N/A 2015    Procedure:  SECTION;  Surgeon: Tanya Levy MD;  Location: [facility]     GYN SURGERY         Social History   Substance Use Topics     Smoking status: Never Smoker     Smokeless tobacco: Never Used     Alcohol use No     Family History   Problem Relation Age of Onset     Family History Negative Mother      Family History Negative Father            Patient under age 50, mutual decision reflected in health maintenance.    She is due for a mammogram.    Pertinent mammograms are reviewed under the imaging tab.  History of abnormal Pap smear: NO - age 30- 65 PAP every 3 years recommended  PAP / HPV Latest Ref Rng & Units 2015 3/6/2012 2011   PAP - NIL NIL LSIL(A)   HPV 16 DNA NEG Negative - -   HPV 18 DNA NEG Negative - -   OTHER HR HPV NEG Negative - -     Reviewed and updated as needed this visit by clinical staff  Tobacco  Allergies  Meds  Med Hx  Surg Hx  Fam Hx  Soc Hx        Reviewed and updated as needed this visit by Provider            ROS:  CONSTITUTIONAL: NEGATIVE for fever, chills, change in weight  INTEGUMENTARU/SKIN: NEGATIVE for worrisome rashes, moles or lesions  EYES: NEGATIVE for vision changes or irritation  ENT: NEGATIVE for ear, mouth and throat problems  RESP: NEGATIVE for significant cough or SOB  BREAST: NEGATIVE for masses, tenderness or discharge  CV: NEGATIVE for chest pain, palpitations or peripheral edema  GI: NEGATIVE for nausea, abdominal pain, heartburn, or change in bowel habits  : NEGATIVE for unusual urinary or vaginal symptoms. Periods are regular.  MUSCULOSKELETAL: NEGATIVE for significant arthralgias or myalgia  NEURO: NEGATIVE for weakness, dizziness or paresthesias  PSYCHIATRIC: NEGATIVE for changes in mood or affect    OBJECTIVE:   /87 (BP Location: Right arm,  "Patient Position: Sitting, Cuff Size: Adult Regular)  Pulse 91  Temp 98.4  F (36.9  C) (Oral)  Resp 16  Ht 5' 6\" (1.676 m)  Wt 183 lb 6.4 oz (83.2 kg)  LMP 06/20/2018 (Exact Date)  SpO2 99%  BMI 29.6 kg/m2  EXAM:  GENERAL: healthy, alert and no distress  NECK: no adenopathy, no asymmetry, masses, or scars and thyroid normal to palpation  HEENT:  Right lower gum with open slit and swelling of surrounding tissue  RESP: lungs clear to auscultation - no rales, rhonchi or wheezes  CV: regular rate and rhythm, normal S1 S2, no S3 or S4, no murmur, click or rub, no peripheral edema and peripheral pulses strong    ABDOMEN: soft, nontender, no hepatosplenomegaly, no masses and bowel sounds normal  MS: no gross musculoskeletal defects noted, no edema    Diagnostic Test Results:  none     ASSESSMENT/PLAN:   1. Female infertility    Patient reports irregular periods and infertility.  Will check FSH/LH to r/o premature ovarian failure in addition to TSH, prolactin      - Follicle stimulating hormone  - Lutropin  - TSH with free T4 reflex  - Prolactin    2. Vitamin D deficiency  Previous low, will recheck  - Vitamin D Deficiency  - Vitamin B12    3. Iron deficiency  Has hx of iron def anemia not on supplements  - CBC with platelets differential  - Iron and iron binding capacity  - Ferritin  - Vitamin B12    4. Encounter for screening mammogram for breast cancer  Due for mammo  - *MA Screening Digital Bilateral; Future    5. Swollen gums  Recent root canal, will given augmentin course given some swelling of gums.  Follow up with dentist    - amoxicillin-clavulanate (AUGMENTIN) 500-125 MG per tablet; Take 1 tablet by mouth 2 times daily  Dispense: 20 tablet; Refill: 0    COUNSELING:   Reviewed preventive health counseling, as reflected in patient instructions       Regular exercise       Healthy diet/nutrition    BP Readings from Last 1 Encounters:   07/18/18 134/87     Estimated body mass index is 29.6 kg/(m^2) as " "calculated from the following:    Height as of this encounter: 5' 6\" (1.676 m).    Weight as of this encounter: 183 lb 6.4 oz (83.2 kg).           reports that she has never smoked. She has never used smokeless tobacco.      Counseling Resources:  ATP IV Guidelines  Pooled Cohorts Equation Calculator  Breast Cancer Risk Calculator  FRAX Risk Assessment  ICSI Preventive Guidelines  Dietary Guidelines for Americans, 2010  USDA's MyPlate  ASA Prophylaxis  Lung CA Screening    Damari Herrera MD  [facility]  "

## 2018-07-18 NOTE — MR AVS SNAPSHOT
After Visit Summary   7/18/2018    Tye Morales    MRN: [id number]           Patient Information     Date Of Birth          1975        Visit Information        Provider Department      7/18/2018 9:00 AM Dmaari Herrera MD; [employer] [facility]        Today's Diagnoses     Female infertility    -  1    Vitamin D deficiency        Iron deficiency        Encounter for screening mammogram for breast cancer        Swollen gums          Care Instructions      Preventive Health Recommendations  Female Ages 40 to 49    Yearly exam:     See your health care provider every year in order to  1. Review health changes.   2. Discuss preventive care.    3. Review your medicines if your doctor prescribed any.      Get a Pap test every three years (unless you have an abnormal result and your provider advises testing more often).      If you get Pap tests with HPV test, you only need to test every 5 years, unless you have an abnormal result. You do not need a Pap test if your uterus was removed (hysterectomy) and you have not had cancer.      You should be tested each year for STDs (sexually transmitted diseases), if you're at risk.     Ask your doctor if you should have a mammogram.      Have a colonoscopy (test for colon cancer) if someone in your family has had colon cancer or polyps before age 50.       Have a cholesterol test every 5 years.       Have a diabetes test (fasting glucose) after age 45. If you are at risk for diabetes, you should have this test every 3 years.    Shots: Get a flu shot each year. Get a tetanus shot every 10 years.     Nutrition:     Eat at least 5 servings of fruits and vegetables each day.    Eat whole-grain bread, whole-wheat pasta and brown rice instead of white grains and rice.    Get adequate Calcium and Vitamin D.      Lifestyle    Exercise at least 150 minutes a week (an average of 30 minutes a day, 5 days a week). This will help you  "control your weight and prevent disease.    Limit alcohol to one drink per day.    No smoking.     Wear sunscreen to prevent skin cancer.    See your dentist every six months for an exam and cleaning.          Follow-ups after your visit        Future tests that were ordered for you today     Open Future Orders        Priority Expected Expires Ordered    *MA Screening Digital Bilateral Routine  2019            Who to contact     If you have questions or need follow up information about today's clinic visit or your schedule please contact [facility] directly at [phone].  Normal or non-critical lab and imaging results will be communicated to you by clinovohart, letter or phone within 4 business days after the clinic has received the results. If you do not hear from us within 7 days, please contact the clinic through Hactust or phone. If you have a critical or abnormal lab result, we will notify you by phone as soon as possible.  Submit refill requests through DWNLD or call your pharmacy and they will forward the refill request to us. Please allow 3 business days for your refill to be completed.          Additional Information About Your Visit        DWNLD Information     DWNLD lets you send messages to your doctor, view your test results, renew your prescriptions, schedule appointments and more. To sign up, go to [email or web address] . Click on \"Log in\" on the left side of the screen, which will take you to the Welcome page. Then click on \"Sign up Now\" on the right side of the page.     You will be asked to enter the access code listed below, as well as some personal information. Please follow the directions to create your username and password.     Your access code is: [email or web address]  Expires: 10/14/2018  3:47 PM     Your access code will  in 90 days. If you need help or a new code, please call your [place] clinic or [phone].        Care EveryWhere ID     This is " "your Care EveryWhere ID. This could be used by other organizations to access your [facility] medical records  [id number]        Your Vitals Were     Pulse Temperature Respirations Height Last Period Pulse Oximetry    91 98.4  F (36.9  C) (Oral) 16 5' 6\" (1.676 m) 06/20/2018 (Exact Date) 99%    BMI (Body Mass Index)                   29.6 kg/m2            Blood Pressure from Last 3 Encounters:   07/18/18 134/87   07/16/18 100/82   03/07/18 112/76    Weight from Last 3 Encounters:   07/18/18 183 lb 6.4 oz (83.2 kg)   07/16/18 182 lb 4.8 oz (82.7 kg)   03/07/18 188 lb 6.4 oz (85.5 kg)              We Performed the Following     CBC with platelets differential     Ferritin     Follicle stimulating hormone     Iron and iron binding capacity     Lutropin     Prolactin     TSH with free T4 reflex     Vitamin B12     Vitamin D Deficiency          Today's Medication Changes          These changes are accurate as of 7/18/18  4:10 PM.  If you have any questions, ask your nurse or doctor.               Start taking these medicines.        Dose/Directions    amoxicillin-clavulanate 500-125 MG per tablet   Commonly known as:  AUGMENTIN   Used for:  Swollen gums   Started by:  Damari Herrera MD        Dose:  1 tablet   Take 1 tablet by mouth 2 times daily   Quantity:  20 tablet   Refills:  0            Where to get your medicines      These medications were sent to [facility] [place] [address] [address] [facility].  [facility] [address] [facility] [address] [facility] [address]     Phone:  [phone]     amoxicillin-clavulanate 500-125 MG per tablet                Primary Care Provider Office Phone # Fax #    Donita Engel [phone]       [address] [facility] [address]        Equal Access to Services     SKYE GOMEZ AH: Clare michaelo Sojohanna, waaxda luqadaha, qaybta kaalmada adeegyada, ilya morales. So [facility] [phone].    ATENCIÓN: Si deanna espmouna hoang santiago " disposición servicios gratuitos de asistencia lingüística. Ronnell garcia [phone].    We comply with applicable federal civil rights laws and [place] laws. We do not discriminate on the basis of race, color, national origin, age, disability, sex, sexual orientation, or gender identity.            Thank you!     Thank you for choosing [facility]  for your care. Our goal is always to provide you with excellent care. Hearing back from our patients is one way we can continue to improve our services. Please take a few minutes to complete the written survey that you may receive in the mail after your visit with us. Thank you!             Your Updated Medication List - Protect others around you: Learn how to safely use, store and throw away your medicines at www.disposemymeds.org.          This list is accurate as of 7/18/18  4:10 PM.  Always use your most recent med list.                   Brand Name Dispense Instructions for use Diagnosis    acetaminophen 500 MG tablet    TYLENOL    50 tablet    Take 2 tablets (1,000 mg) by mouth every 6 hours as needed for mild pain    Throat pain       amoxicillin-clavulanate 500-125 MG per tablet    AUGMENTIN    20 tablet    Take 1 tablet by mouth 2 times daily    Swollen gums       Calcium Carb-Cholecalciferol 1000-800 MG-UNIT Tabs    CALCIUM 1000 + D    100 tablet    Take 1 tablet by mouth daily TAKE WITH FOOD, FOR BONE HEALTH AND LOW VITAMIN D (СВТЕЛАНА BUI [facility])    Vitamin D deficiency       cholecalciferol 35966 units capsule    VITAMIN D3    40 capsule    1 CAP 2 X A WK FOR 2 WKS, THEN 1 CAP 1ST AND 15TH OF EACH MONTH, FOR VIT D DEFICIENCY    Vitamin D deficiency       ferrous sulfate 325 (65 Fe) MG tablet    IRON    90 tablet    Take 1 tablet (325 mg) by mouth daily (with breakfast) IRON SUPPLEMENT, TAKE WITH 4 OZ OF APPLE JUICE    Iron deficiency       ibuprofen 600 MG tablet    ADVIL/MOTRIN    30 tablet    Take 1 tablet (600 mg) by mouth every 8  hours as needed for moderate pain MUST TAKE WITH FOOD        magic mouthwash suspension    ENTER INGREDIENTS IN COMMENTS    160 mL    Swish and spit 5-10 mLs in mouth every 6 hours as needed compound 4 grams carafate susp in 30 ml Benadryl (12.5 mg/5 ml), 60 ml Maalox and 30 ml Viscous Lidocaine    Pain in a tooth or teeth       ranitidine 150 MG tablet    ZANTAC    60 tablet    Take 1 tablet (150 mg) by mouth 2 times daily    Gastroesophageal reflux disease without esophagitis

## 2018-07-18 NOTE — NURSING NOTE
"Vital signs:  Temp: 98.4  F (36.9  C) Temp src: Oral BP: 134/87 Pulse: 91   Resp: 16 SpO2: 99 %     Height: 5' 6\" (167.6 cm) Weight: 183 lb 6.4 oz (83.2 kg)  Estimated body mass index is 29.6 kg/(m^2) as calculated from the following:    Height as of this encounter: 5' 6\" (1.676 m).    Weight as of this encounter: 183 lb 6.4 oz (83.2 kg).      Physical.  "

## 2018-07-18 NOTE — PATIENT INSTRUCTIONS
Preventive Health Recommendations  Female Ages 40 to 49    Yearly exam:     See your health care provider every year in order to  1. Review health changes.   2. Discuss preventive care.    3. Review your medicines if your doctor prescribed any.      Get a Pap test every three years (unless you have an abnormal result and your provider advises testing more often).      If you get Pap tests with HPV test, you only need to test every 5 years, unless you have an abnormal result. You do not need a Pap test if your uterus was removed (hysterectomy) and you have not had cancer.      You should be tested each year for STDs (sexually transmitted diseases), if you're at risk.     Ask your doctor if you should have a mammogram.      Have a colonoscopy (test for colon cancer) if someone in your family has had colon cancer or polyps before age 50.       Have a cholesterol test every 5 years.       Have a diabetes test (fasting glucose) after age 45. If you are at risk for diabetes, you should have this test every 3 years.    Shots: Get a flu shot each year. Get a tetanus shot every 10 years.     Nutrition:     Eat at least 5 servings of fruits and vegetables each day.    Eat whole-grain bread, whole-wheat pasta and brown rice instead of white grains and rice.    Get adequate Calcium and Vitamin D.      Lifestyle    Exercise at least 150 minutes a week (an average of 30 minutes a day, 5 days a week). This will help you control your weight and prevent disease.    Limit alcohol to one drink per day.    No smoking.     Wear sunscreen to prevent skin cancer.    See your dentist every six months for an exam and cleaning.

## 2018-07-19 LAB
DEPRECATED CALCIDIOL+CALCIFEROL SERPL-MC: 33 UG/L (ref 20–75)
FERRITIN SERPL-MCNC: 38 NG/ML (ref 12–150)
IRON SATN MFR SERPL: 28 % (ref 15–46)
IRON SERPL-MCNC: 72 UG/DL (ref 35–180)
TIBC SERPL-MCNC: 256 UG/DL (ref 240–430)
TSH SERPL DL<=0.005 MIU/L-ACNC: 1.74 MU/L (ref 0.4–4)

## 2018-07-19 ASSESSMENT — PATIENT HEALTH QUESTIONNAIRE - PHQ9: SUM OF ALL RESPONSES TO PHQ QUESTIONS 1-9: 0

## 2018-07-19 ASSESSMENT — ANXIETY QUESTIONNAIRES: GAD7 TOTAL SCORE: 0

## 2018-09-14 ENCOUNTER — TELEPHONE (OUTPATIENT)
Dept: INTERNAL MEDICINE | Facility: CLINIC | Age: 43
End: 2018-09-14

## 2018-09-14 NOTE — TELEPHONE ENCOUNTER
Patient is requesting a refill on Omeprazole 20 mg capsules, take 1 capsule every morning.    If possible, please send new RX down to the pharmacy, or let us know if the patient needs an appointment.    Thank you!  Siada Welch,[facility]

## 2018-09-14 NOTE — TELEPHONE ENCOUNTER
Pt was started at zantac, can we determine if she is request omeprazole by mistake or if zantac not working and wishing to be restarted in omperazole?

## 2018-10-08 ENCOUNTER — OFFICE VISIT (OUTPATIENT)
Dept: INTERNAL MEDICINE | Facility: CLINIC | Age: 43
End: 2018-10-08
Payer: COMMERCIAL

## 2018-10-08 VITALS
SYSTOLIC BLOOD PRESSURE: 124 MMHG | OXYGEN SATURATION: 100 % | HEART RATE: 104 BPM | BODY MASS INDEX: 29.72 KG/M2 | WEIGHT: 184.9 LBS | HEIGHT: 66 IN | TEMPERATURE: 97.6 F | DIASTOLIC BLOOD PRESSURE: 70 MMHG | RESPIRATION RATE: 16 BRPM

## 2018-10-08 DIAGNOSIS — R20.0 NUMBNESS AND TINGLING OF LEG: ICD-10-CM

## 2018-10-08 DIAGNOSIS — Z12.31 ENCOUNTER FOR SCREENING MAMMOGRAM FOR BREAST CANCER: ICD-10-CM

## 2018-10-08 DIAGNOSIS — M32.9 SYSTEMIC LUPUS ERYTHEMATOSUS, UNSPECIFIED SLE TYPE, UNSPECIFIED ORGAN INVOLVEMENT STATUS (H): Primary | ICD-10-CM

## 2018-10-08 DIAGNOSIS — R20.2 NUMBNESS AND TINGLING OF LEG: ICD-10-CM

## 2018-10-08 LAB
CRP SERPL-MCNC: 4.8 MG/L (ref 0–8)
ERYTHROCYTE [SEDIMENTATION RATE] IN BLOOD BY WESTERGREN METHOD: 29 MM/H (ref 0–20)
VIT B12 SERPL-MCNC: 427 PG/ML (ref 193–986)

## 2018-10-08 PROCEDURE — 99000 SPECIMEN HANDLING OFFICE-LAB: CPT | Performed by: INTERNAL MEDICINE

## 2018-10-08 PROCEDURE — 86140 C-REACTIVE PROTEIN: CPT | Performed by: INTERNAL MEDICINE

## 2018-10-08 PROCEDURE — 82306 VITAMIN D 25 HYDROXY: CPT | Performed by: INTERNAL MEDICINE

## 2018-10-08 PROCEDURE — 86160 COMPLEMENT ANTIGEN: CPT | Performed by: INTERNAL MEDICINE

## 2018-10-08 PROCEDURE — 86038 ANTINUCLEAR ANTIBODIES: CPT | Performed by: INTERNAL MEDICINE

## 2018-10-08 PROCEDURE — 82607 VITAMIN B-12: CPT | Performed by: INTERNAL MEDICINE

## 2018-10-08 PROCEDURE — 86225 DNA ANTIBODY NATIVE: CPT | Performed by: INTERNAL MEDICINE

## 2018-10-08 PROCEDURE — 36415 COLL VENOUS BLD VENIPUNCTURE: CPT | Performed by: INTERNAL MEDICINE

## 2018-10-08 PROCEDURE — 85652 RBC SED RATE AUTOMATED: CPT | Performed by: INTERNAL MEDICINE

## 2018-10-08 PROCEDURE — 99214 OFFICE O/P EST MOD 30 MIN: CPT | Performed by: INTERNAL MEDICINE

## 2018-10-08 PROCEDURE — 86039 ANTINUCLEAR ANTIBODIES (ANA): CPT | Performed by: INTERNAL MEDICINE

## 2018-10-08 PROCEDURE — 84207 ASSAY OF VITAMIN B-6: CPT | Mod: 90 | Performed by: INTERNAL MEDICINE

## 2018-10-08 PROCEDURE — 80053 COMPREHEN METABOLIC PANEL: CPT | Performed by: INTERNAL MEDICINE

## 2018-10-08 NOTE — NURSING NOTE
"Vital signs:  Temp: 97.6  F (36.4  C) Temp src: Oral BP: 124/70 Pulse: 104   Resp: 16 SpO2: 100 %     Height: 5' 6\" (167.6 cm) Weight: 184 lb 14.4 oz (83.9 kg)  Estimated body mass index is 29.84 kg/(m^2) as calculated from the following:    Height as of this encounter: 5' 6\" (1.676 m).    Weight as of this encounter: 184 lb 14.4 oz (83.9 kg).        "

## 2018-10-08 NOTE — PROGRESS NOTES
ASSESSMENT/PLAN:       1. Systemic lupus erythematosus, unspecified SLE type, unspecified organ involvement status (H)    Patient with history of SLE not on medications currently.  Plaquenil was suggested but she chose not to take.  Will check lupus and inflammatory labs as below.    - CRP inflammation  - Anti Nuclear Lisa IgG by IFA with Reflex  - Erythrocyte sedimentation rate auto  - DNA double stranded antibodies  - Complement C3  - Complement C4  - Comprehensive metabolic panel    2. Encounter for screening mammogram for breast cancer  Patient reported pleuritic chest pain worse on left but no focal breast pain. No lumps noted on exam. Will order MA digital   - MA Diagnostic Digital Bilateral; Future    3. Numbness and tingling of leg  Will check vitamins deficiency to see if this is contributing.   - Vitamin B12  - Vitamin D Deficiency  - Vitamin B6        Damari Herrera MD  [facility]        SUBJECTIVE:   Tye Morales is a 43 year old female who presents to clinic today for the following health issues:      She has pain in her left breast for almost a month but describes this as more pleurtic pain.    She notes pleuritic chest pain, shortness of breath.     Of note, she does have history of SLE and plaquenil was recommended to her but she never started.   No rashes, oral sores, no hair loss.  She does report numbness/tingling of leg    +SSA antibody.    She is desiring pregnancy, and sees an infertility specialist. All hormones normal, still has not conceived        Problem list and histories reviewed & adjusted, as indicated.  Additional history: as documented    Patient Active Problem List   Diagnosis     External hemorrhoids     Vitamin D deficiency     LSIL (low grade squamous intraepithelial lesion) on Pap smear     GERD (gastroesophageal reflux disease)     Iron deficiency     Allergic rhinitis, unspecified allergic rhinitis type     Migraine with aura and without status migrainosus,  "not intractable     Systemic lupus erythematosus, unspecified SLE type, unspecified organ involvement status (H)     Past Surgical History:   Procedure Laterality Date      SECTION  2013    Procedure:  SECTION;  primary  section, failure to progress;  Surgeon: Nathan Salinas MD;  Location: [facility]      SECTION N/A 2015    Procedure:  SECTION;  Surgeon: Tanya Levy MD;  Location: [facility]     GYN SURGERY         Social History   Substance Use Topics     Smoking status: Never Smoker     Smokeless tobacco: Never Used     Alcohol use No     Family History   Problem Relation Age of Onset     Family History Negative Mother      Family History Negative Father            Reviewed and updated as needed this visit by clinical staff  Tobacco  Allergies  Meds  Med Hx  Surg Hx  Fam Hx  Soc Hx      Reviewed and updated as needed this visit by Provider         ROS:  Constitutional, HEENT, cardiovascular, pulmonary, gi and gu systems are negative, except as otherwise noted.    OBJECTIVE:     /70 (BP Location: Right arm, Patient Position: Sitting, Cuff Size: Adult Regular)  Pulse 104  Temp 97.6  F (36.4  C) (Oral)  Resp 16  Ht 5' 6\" (1.676 m)  Wt 184 lb 14.4 oz (83.9 kg)  LMP 2018 (Exact Date)  SpO2 100%  BMI 29.84 kg/m2  Body mass index is 29.84 kg/(m^2).  GENERAL: healthy, alert and no distress  RESP: lungs clear to auscultation - no rales, rhonchi or wheezes  CV: regular rate and rhythm, normal S1 S2, no S3 or S4, no murmur, click or rub, no peripheral edema and peripheral pulses strong  Breast:  Left breast, no focal lumps, masses  ABDOMEN: soft, nontender, no hepatosplenomegaly, no masses and bowel sounds normal  MS: some warmth noted over wrists and fingers, no active synovitis however  Skin: melasma noted on face    Diagnostic Test Results:  none           "

## 2018-10-08 NOTE — MR AVS SNAPSHOT
"              After Visit Summary   10/8/2018    Tye Morales    MRN: [id number]           Patient Information     Date Of Birth          1975        Visit Information        Provider Department      10/8/2018 1:05 PM Damari Herrera MD; [employer] [facility]        Today's Diagnoses     Systemic lupus erythematosus, unspecified SLE type, unspecified organ involvement status (H)    -  1    Encounter for screening mammogram for breast cancer        Numbness and tingling of leg           Follow-ups after your visit        Future tests that were ordered for you today     Open Future Orders        Priority Expected Expires Ordered    MA Diagnostic Digital Bilateral Routine  10/8/2019 10/8/2018            Who to contact     If you have questions or need follow up information about today's clinic visit or your schedule please contact [facility] directly at [phone].  Normal or non-critical lab and imaging results will be communicated to you by MyChart, letter or phone within 4 business days after the clinic has received the results. If you do not hear from us within 7 days, please contact the clinic through MyChart or phone. If you have a critical or abnormal lab result, we will notify you by phone as soon as possible.  Submit refill requests through Fronto or call your pharmacy and they will forward the refill request to us. Please allow 3 business days for your refill to be completed.          Additional Information About Your Visit        Care EveryWhere ID     This is your Care EveryWhere ID. This could be used by other organizations to access your [facility] medical records  [id number]        Your Vitals Were     Pulse Temperature Respirations Height Last Period Pulse Oximetry    104 97.6  F (36.4  C) (Oral) 16 5' 6\" (1.676 m) 09/26/2018 (Exact Date) 100%    BMI (Body Mass Index)                   29.84 kg/m2            Blood Pressure from Last 3 " Encounters:   10/08/18 124/70   07/18/18 134/87   07/16/18 100/82    Weight from Last 3 Encounters:   10/08/18 184 lb 14.4 oz (83.9 kg)   07/18/18 183 lb 6.4 oz (83.2 kg)   07/16/18 182 lb 4.8 oz (82.7 kg)              We Performed the Following     Anti Nuclear Lisa IgG by IFA with Reflex     Complement C3     Complement C4     Comprehensive metabolic panel     CRP inflammation     DNA double stranded antibodies     Erythrocyte sedimentation rate auto     Vitamin B12     Vitamin B6     Vitamin D Deficiency        Primary Care Provider Office Phone # Fax #    Donita Engel [phone]       [address] [facility] [address]        Equal Access to Services     JAKOB GOMEZ : Hadii aad ku hadasho Sojohanna, waaxda luqadaha, qaybta kaalmada adeegyada, ilya morales. So [facility] [phone].    ATENCIÓN: Si habla español, tiene a santiago disposición servicios gratuitos de asistencia lingüística. Llame al [phone].    We comply with applicable federal civil rights laws and [place] laws. We do not discriminate on the basis of race, color, national origin, age, disability, sex, sexual orientation, or gender identity.            Thank you!     Thank you for choosing [facility]  for your care. Our goal is always to provide you with excellent care. Hearing back from our patients is one way we can continue to improve our services. Please take a few minutes to complete the written survey that you may receive in the mail after your visit with us. Thank you!             Your Updated Medication List - Protect others around you: Learn how to safely use, store and throw away your medicines at www.disposemymeds.org.          This list is accurate as of 10/8/18  2:29 PM.  Always use your most recent med list.                   Brand Name Dispense Instructions for use Diagnosis    acetaminophen 500 MG tablet    TYLENOL    50 tablet    Take 2 tablets (1,000 mg) by mouth  every 6 hours as needed for mild pain    Throat pain       Calcium Carb-Cholecalciferol 1000-800 MG-UNIT Tabs    CALCIUM 1000 + D    100 tablet    Take 1 tablet by mouth daily TAKE WITH FOOD, FOR BONE HEALTH AND LOW VITAMIN D (СВЕТЛАНА CASTELLANOS [facility])    Vitamin D deficiency       cholecalciferol 83277 units capsule    VITAMIN D3    40 capsule    1 CAP 2 X A WK FOR 2 WKS, THEN 1 CAP 1ST AND 15TH OF EACH MONTH, FOR VIT D DEFICIENCY    Vitamin D deficiency       ferrous sulfate 325 (65 Fe) MG tablet    IRON    90 tablet    Take 1 tablet (325 mg) by mouth daily (with breakfast) IRON SUPPLEMENT, TAKE WITH 4 OZ OF APPLE JUICE    Iron deficiency       ranitidine 150 MG tablet    ZANTAC    60 tablet    Take 1 tablet (150 mg) by mouth 2 times daily    Gastroesophageal reflux disease without esophagitis

## 2018-10-09 LAB
ALBUMIN SERPL-MCNC: 3.3 G/DL (ref 3.4–5)
ALP SERPL-CCNC: 73 U/L (ref 40–150)
ALT SERPL W P-5'-P-CCNC: 18 U/L (ref 0–50)
ANA PAT SER IF-IMP: ABNORMAL
ANA SER QL IF: POSITIVE
ANA TITR SER IF: ABNORMAL {TITER}
ANION GAP SERPL CALCULATED.3IONS-SCNC: 10 MMOL/L (ref 3–14)
AST SERPL W P-5'-P-CCNC: 15 U/L (ref 0–45)
BILIRUB SERPL-MCNC: 0.3 MG/DL (ref 0.2–1.3)
BUN SERPL-MCNC: 11 MG/DL (ref 7–30)
C3 SERPL-MCNC: 108 MG/DL (ref 76–169)
C4 SERPL-MCNC: 26 MG/DL (ref 15–50)
CALCIUM SERPL-MCNC: 8.7 MG/DL (ref 8.5–10.1)
CHLORIDE SERPL-SCNC: 107 MMOL/L (ref 94–109)
CO2 SERPL-SCNC: 22 MMOL/L (ref 20–32)
CREAT SERPL-MCNC: 0.51 MG/DL (ref 0.52–1.04)
DEPRECATED CALCIDIOL+CALCIFEROL SERPL-MC: 31 UG/L (ref 20–75)
DSDNA AB SER-ACNC: 1 IU/ML
GFR SERPL CREATININE-BSD FRML MDRD: >90 ML/MIN/1.7M2
GLUCOSE SERPL-MCNC: 86 MG/DL (ref 70–99)
POTASSIUM SERPL-SCNC: 3.8 MMOL/L (ref 3.4–5.3)
PROT SERPL-MCNC: 7.4 G/DL (ref 6.8–8.8)
SODIUM SERPL-SCNC: 139 MMOL/L (ref 133–144)

## 2018-10-11 LAB — VIT B6 SERPL-MCNC: 21.9 NMOL/L (ref 20–125)

## 2018-10-28 ENCOUNTER — HEALTH MAINTENANCE LETTER (OUTPATIENT)
Age: 43
End: 2018-10-28

## 2018-11-16 ENCOUNTER — TELEPHONE (OUTPATIENT)
Dept: INTERNAL MEDICINE | Facility: CLINIC | Age: 43
End: 2018-11-16

## 2018-11-16 DIAGNOSIS — Z12.31 ENCOUNTER FOR SCREENING MAMMOGRAM FOR BREAST CANCER: Primary | ICD-10-CM

## 2018-11-16 NOTE — TELEPHONE ENCOUNTER
[facility] calls.   They are not sure that the Diagnostic Mammogram with US would be best test for pts Chest pain. If the pain is deep, then the mammogram or US, will not show anything.   They will talk with pt on Monday and probably change it to screening mammogram.     They advise that Dr Herrera may want to ordered another test for the chest pain.

## 2018-11-19 ENCOUNTER — HOSPITAL ENCOUNTER (OUTPATIENT)
Dept: MAMMOGRAPHY | Facility: CLINIC | Age: 43
Discharge: HOME | End: 2018-11-19
Attending: INTERNAL MEDICINE | Admitting: INTERNAL MEDICINE
Payer: COMMERCIAL

## 2018-11-19 ENCOUNTER — HOSPITAL ENCOUNTER (OUTPATIENT)
Dept: ULTRASOUND IMAGING | Facility: CLINIC | Age: 43
End: 2018-11-19
Attending: INTERNAL MEDICINE
Payer: COMMERCIAL

## 2018-11-19 DIAGNOSIS — Z12.31 ENCOUNTER FOR SCREENING MAMMOGRAM FOR BREAST CANCER: ICD-10-CM

## 2018-11-19 DIAGNOSIS — N64.4 BREAST PAIN, LEFT: ICD-10-CM

## 2018-11-19 PROCEDURE — G0279 TOMOSYNTHESIS, MAMMO: HCPCS

## 2018-11-19 PROCEDURE — 77066 DX MAMMO INCL CAD BI: CPT

## 2018-11-19 PROCEDURE — 76642 ULTRASOUND BREAST LIMITED: CPT | Mod: LT

## 2018-11-19 NOTE — TELEPHONE ENCOUNTER
Yes screening mammo is more appropriate will order, I will talk to her more about her pleuritic chest pain

## 2018-12-14 ENCOUNTER — OFFICE VISIT (OUTPATIENT)
Dept: INTERNAL MEDICINE | Facility: CLINIC | Age: 43
End: 2018-12-14
Payer: COMMERCIAL

## 2018-12-14 VITALS
HEART RATE: 83 BPM | WEIGHT: 180.6 LBS | TEMPERATURE: 98.4 F | OXYGEN SATURATION: 99 % | SYSTOLIC BLOOD PRESSURE: 108 MMHG | DIASTOLIC BLOOD PRESSURE: 68 MMHG | BODY MASS INDEX: 29.15 KG/M2

## 2018-12-14 DIAGNOSIS — R51.9 NONINTRACTABLE HEADACHE, UNSPECIFIED CHRONICITY PATTERN, UNSPECIFIED HEADACHE TYPE: Primary | ICD-10-CM

## 2018-12-14 DIAGNOSIS — E61.1 IRON DEFICIENCY: ICD-10-CM

## 2018-12-14 DIAGNOSIS — R42 DIZZINESS: ICD-10-CM

## 2018-12-14 DIAGNOSIS — K21.9 GASTROESOPHAGEAL REFLUX DISEASE WITHOUT ESOPHAGITIS: ICD-10-CM

## 2018-12-14 DIAGNOSIS — E55.9 VITAMIN D DEFICIENCY: ICD-10-CM

## 2018-12-14 LAB
ERYTHROCYTE [DISTWIDTH] IN BLOOD BY AUTOMATED COUNT: 13.2 % (ref 10–15)
FERRITIN SERPL-MCNC: 24 NG/ML (ref 12–150)
HCT VFR BLD AUTO: 37.6 % (ref 35–47)
HGB BLD-MCNC: 12.4 G/DL (ref 11.7–15.7)
IRON SATN MFR SERPL: 29 % (ref 15–46)
IRON SERPL-MCNC: 77 UG/DL (ref 35–180)
MCH RBC QN AUTO: 31.6 PG (ref 26.5–33)
MCHC RBC AUTO-ENTMCNC: 33 G/DL (ref 31.5–36.5)
MCV RBC AUTO: 96 FL (ref 78–100)
PLATELET # BLD AUTO: 169 10E9/L (ref 150–450)
RBC # BLD AUTO: 3.92 10E12/L (ref 3.8–5.2)
TIBC SERPL-MCNC: 266 UG/DL (ref 240–430)
WBC # BLD AUTO: 2.3 10E9/L (ref 4–11)

## 2018-12-14 PROCEDURE — 83540 ASSAY OF IRON: CPT | Performed by: PHYSICIAN ASSISTANT

## 2018-12-14 PROCEDURE — 82728 ASSAY OF FERRITIN: CPT | Performed by: PHYSICIAN ASSISTANT

## 2018-12-14 PROCEDURE — 99214 OFFICE O/P EST MOD 30 MIN: CPT | Performed by: PHYSICIAN ASSISTANT

## 2018-12-14 PROCEDURE — 83550 IRON BINDING TEST: CPT | Performed by: PHYSICIAN ASSISTANT

## 2018-12-14 PROCEDURE — 85027 COMPLETE CBC AUTOMATED: CPT | Performed by: PHYSICIAN ASSISTANT

## 2018-12-14 PROCEDURE — 36415 COLL VENOUS BLD VENIPUNCTURE: CPT | Performed by: PHYSICIAN ASSISTANT

## 2018-12-14 RX ORDER — FERROUS SULFATE 325(65) MG
325 TABLET ORAL
Qty: 90 TABLET | Refills: 2 | Status: CANCELLED | OUTPATIENT
Start: 2018-12-14

## 2018-12-14 RX ORDER — SUMATRIPTAN 50 MG/1
50 TABLET, FILM COATED ORAL
Qty: 9 TABLET | Refills: 1 | Status: SHIPPED | OUTPATIENT
Start: 2018-12-14 | End: 2019-09-10

## 2018-12-14 NOTE — PROGRESS NOTES
SUBJECTIVE:   Tye Morales is a 43 year old female who presents to clinic today for the following health issues:    Dizziness      Duration: 3 weeks    Description   Feeling faint:  no   Feeling like the surroundings are moving: no   Loss of consciousness or falls: no   Feels like her iron is low   Sometimes when she stands she feels dizzy (lightheaded)  Also headache, located on both side of head and into neck and top of head, onset- one week with right sided eye pain   - does not feel like previous migraines   - this is more severe   - pain described as a throbbing pain   - sensitivity to sound, but no nausea    No room spinning     Intensity:  mild    Accompanying signs and symptoms:   Nausea/vomitting: no   Palpitations: no   Weakness in arms or legs: YES- Sometimes  Vision or speech changes: no   Ringing in ears (Tinnitus): no   Hearing loss related to dizziness: no   Other (fevers/chills/sweating/dyspnea): no     History (similar episodes/head trauma/previous evaluation/recent bleeding): None    Precipitating or alleviating factors (new meds/chemicals): None  Worse with activity/head movement: no     Therapies tried and outcome: None    Pt also reports that her neck muscles ache and she is having headaches along with her right eye that feels pressure behind also.       Problem list and histories reviewed & adjusted, as indicated.  Additional history: as documented      Reviewed and updated as needed this visit by clinical staff  Tobacco  Meds  Problems       Reviewed and updated as needed this visit by Provider  Meds  Problems           OBJECTIVE:     /68   Pulse 83   Temp 98.4  F (36.9  C) (Oral)   Wt 81.9 kg (180 lb 9.6 oz)   SpO2 99%   BMI 29.15 kg/m    Body mass index is 29.15 kg/m .  GENERAL: healthy, alert and no distress  EYES: Eyes grossly normal to inspection, PERRL and conjunctivae and sclerae normal  HENT: ear canals and TM's normal, nose and mouth without ulcers or  lesions  NECK: no adenopathy, no asymmetry, masses, or scars and thyroid normal to palpation  RESP: lungs clear to auscultation - no rales, rhonchi or wheezes  CV: regular rates and rhythm, normal S1 S2, no S3 or S4, no murmur, click or rub and peripheral pulses strong  NEURO: Normal strength and tone, sensory exam grossly normal, mentation intact, cranial nerves 2-12 intact and rapid alternating movements normal    ASSESSMENT/PLAN:       1. Nonintractable headache, unspecified chronicity pattern, unspecified headache type  2. Dizziness  - reviewed case with MD on staff, initial thought that it could be iron deficiency, but this returned within normal limits, thus MRI ordered   - SUMAtriptan (IMITREX) 50 MG tablet; Take 1 tablet (50 mg) by mouth at onset of headache for migraine May repeat in 2 hours. Max 4 tablets/24 hours.  Dispense: 9 tablet; Refill: 1  - MR Brain w/o Contrast; Future  - CBC with platelets  - Ferritin  - Iron and iron binding capacity    3. Vitamin D deficiency  - Calcium Carb-Cholecalciferol (CALCIUM 1000 + D) 1000-800 MG-UNIT TABS; Take 1 tablet by mouth daily TAKE WITH FOOD, FOR BONE HEALTH AND LOW VITAMIN D ([facility])  Dispense: 100 tablet; Refill: PRN    4. Iron deficiency  - screened as above     5. Gastroesophageal reflux disease without esophagitis  - refill requested   - ranitidine (ZANTAC) 150 MG tablet; Take 1 tablet (150 mg) by mouth 2 times daily  Dispense: 60 tablet; Refill: 11      Dominga Foster PA-C  [facility]

## 2019-04-15 ENCOUNTER — OFFICE VISIT (OUTPATIENT)
Dept: INTERNAL MEDICINE | Facility: CLINIC | Age: 44
End: 2019-04-15
Payer: COMMERCIAL

## 2019-04-15 VITALS
TEMPERATURE: 98.3 F | WEIGHT: 185 LBS | DIASTOLIC BLOOD PRESSURE: 68 MMHG | RESPIRATION RATE: 16 BRPM | OXYGEN SATURATION: 99 % | HEART RATE: 84 BPM | BODY MASS INDEX: 29.86 KG/M2 | SYSTOLIC BLOOD PRESSURE: 106 MMHG

## 2019-04-15 DIAGNOSIS — R53.83 FATIGUE, UNSPECIFIED TYPE: ICD-10-CM

## 2019-04-15 DIAGNOSIS — K21.9 GASTROESOPHAGEAL REFLUX DISEASE WITHOUT ESOPHAGITIS: ICD-10-CM

## 2019-04-15 DIAGNOSIS — N89.8 VAGINAL DISCHARGE: Primary | ICD-10-CM

## 2019-04-15 LAB
ALBUMIN SERPL-MCNC: 3.3 G/DL (ref 3.4–5)
ALP SERPL-CCNC: 77 U/L (ref 40–150)
ALT SERPL W P-5'-P-CCNC: 19 U/L (ref 0–50)
ANION GAP SERPL CALCULATED.3IONS-SCNC: 6 MMOL/L (ref 3–14)
AST SERPL W P-5'-P-CCNC: 15 U/L (ref 0–45)
BASOPHILS # BLD AUTO: 0 10E9/L (ref 0–0.2)
BASOPHILS NFR BLD AUTO: 0.3 %
BILIRUB SERPL-MCNC: 0.2 MG/DL (ref 0.2–1.3)
BUN SERPL-MCNC: 5 MG/DL (ref 7–30)
CALCIUM SERPL-MCNC: 8.8 MG/DL (ref 8.5–10.1)
CHLORIDE SERPL-SCNC: 107 MMOL/L (ref 94–109)
CO2 SERPL-SCNC: 26 MMOL/L (ref 20–32)
CREAT SERPL-MCNC: 0.51 MG/DL (ref 0.52–1.04)
DIFFERENTIAL METHOD BLD: ABNORMAL
EOSINOPHIL # BLD AUTO: 0.1 10E9/L (ref 0–0.7)
EOSINOPHIL NFR BLD AUTO: 1.7 %
ERYTHROCYTE [DISTWIDTH] IN BLOOD BY AUTOMATED COUNT: 13.3 % (ref 10–15)
FERRITIN SERPL-MCNC: 15 NG/ML (ref 12–150)
GFR SERPL CREATININE-BSD FRML MDRD: >90 ML/MIN/{1.73_M2}
GLUCOSE SERPL-MCNC: 91 MG/DL (ref 70–99)
HCT VFR BLD AUTO: 36.6 % (ref 35–47)
HGB BLD-MCNC: 12 G/DL (ref 11.7–15.7)
IRON SATN MFR SERPL: 27 % (ref 15–46)
IRON SERPL-MCNC: 74 UG/DL (ref 35–180)
LYMPHOCYTES # BLD AUTO: 1.6 10E9/L (ref 0.8–5.3)
LYMPHOCYTES NFR BLD AUTO: 44.2 %
MCH RBC QN AUTO: 31.9 PG (ref 26.5–33)
MCHC RBC AUTO-ENTMCNC: 32.8 G/DL (ref 31.5–36.5)
MCV RBC AUTO: 97 FL (ref 78–100)
MONOCYTES # BLD AUTO: 0.3 10E9/L (ref 0–1.3)
MONOCYTES NFR BLD AUTO: 9.3 %
NEUTROPHILS # BLD AUTO: 1.6 10E9/L (ref 1.6–8.3)
NEUTROPHILS NFR BLD AUTO: 44.5 %
PLATELET # BLD AUTO: 206 10E9/L (ref 150–450)
POTASSIUM SERPL-SCNC: 3.8 MMOL/L (ref 3.4–5.3)
PROT SERPL-MCNC: 7.6 G/DL (ref 6.8–8.8)
RBC # BLD AUTO: 3.76 10E12/L (ref 3.8–5.2)
SODIUM SERPL-SCNC: 139 MMOL/L (ref 133–144)
SPECIMEN SOURCE: NORMAL
TIBC SERPL-MCNC: 272 UG/DL (ref 240–430)
VIT B12 SERPL-MCNC: 453 PG/ML (ref 193–986)
WBC # BLD AUTO: 3.6 10E9/L (ref 4–11)
WET PREP SPEC: NORMAL

## 2019-04-15 PROCEDURE — 82306 VITAMIN D 25 HYDROXY: CPT | Performed by: PHYSICIAN ASSISTANT

## 2019-04-15 PROCEDURE — 83540 ASSAY OF IRON: CPT | Performed by: PHYSICIAN ASSISTANT

## 2019-04-15 PROCEDURE — 99214 OFFICE O/P EST MOD 30 MIN: CPT | Performed by: PHYSICIAN ASSISTANT

## 2019-04-15 PROCEDURE — 82607 VITAMIN B-12: CPT | Performed by: PHYSICIAN ASSISTANT

## 2019-04-15 PROCEDURE — 80053 COMPREHEN METABOLIC PANEL: CPT | Performed by: PHYSICIAN ASSISTANT

## 2019-04-15 PROCEDURE — 82728 ASSAY OF FERRITIN: CPT | Performed by: PHYSICIAN ASSISTANT

## 2019-04-15 PROCEDURE — 87210 SMEAR WET MOUNT SALINE/INK: CPT | Performed by: PHYSICIAN ASSISTANT

## 2019-04-15 PROCEDURE — 85025 COMPLETE CBC W/AUTO DIFF WBC: CPT | Performed by: PHYSICIAN ASSISTANT

## 2019-04-15 PROCEDURE — 36415 COLL VENOUS BLD VENIPUNCTURE: CPT | Performed by: PHYSICIAN ASSISTANT

## 2019-04-15 PROCEDURE — 83550 IRON BINDING TEST: CPT | Performed by: PHYSICIAN ASSISTANT

## 2019-04-15 NOTE — PROGRESS NOTES
SUBJECTIVE:   Tye Morales is a 44 year old female who presents to clinic today for the following   health issues:        Abdominal Pain      Duration: 2-3 weeks    Description (location/character/radiation): Lower quadrant abdominal pain, midline     Pain comes and goes without no triggers     Pt has vaginal discharge and itching        Associated flank pain: None  Occasional constipation     Intensity:  moderate    Accompanying signs and symptoms:        Fever/Chills: no        Gas/Bloating: no        Nausea/vomitting: YES- Nausea       Diarrhea: no        Dysuria or Hematuria: no     History (previous similar pain/trauma/previous testing):     Precipitating or alleviating factors:       Pain worse with eating/BM/urination:        Pain relieved by BM: no     Therapies tried and outcome: None    LMP:  4/4/19      1. Patient reports feeling fatigued x 1 month.   - patient feeling fatigued and tiredness  - pt concerns for low iron and vitamins   - normal periods   - pt is sleeping okay   - no loud snoring     2. Pt would like to be tested for H pylori   - history of H pylori   - about one month   - has acid reflux symptoms   - taking zantac, does help some, taking inconsistently       Additional history: as documented    Reviewed  and updated as needed this visit by clinical staff  Tobacco  Allergies  Meds  Problems         Reviewed and updated as needed this visit by Provider  Meds  Problems           OBJECTIVE:     /68   Pulse 84   Temp 98.3  F (36.8  C) (Oral)   Resp 16   Wt 83.9 kg (185 lb)   LMP 04/04/2019   SpO2 99%   BMI 29.86 kg/m    Body mass index is 29.86 kg/m .  GENERAL: healthy, alert and no distress  RESP: lungs clear to auscultation - no rales, rhonchi or wheezes  CV: regular rates and rhythm, normal S1 S2, no S3 or S4 and no murmur, click or rub  ABDOMEN: soft, without hepatosplenomegaly or masses, tenderness epigastric, no guarding or rebound tenderness and bowel sounds  normal    Diagnostic Test Results:  Results for orders placed or performed in visit on 04/15/19   Comprehensive metabolic panel   Result Value Ref Range    Sodium 139 133 - 144 mmol/L    Potassium 3.8 3.4 - 5.3 mmol/L    Chloride 107 94 - 109 mmol/L    Carbon Dioxide 26 20 - 32 mmol/L    Anion Gap 6 3 - 14 mmol/L    Glucose 91 70 - 99 mg/dL    Urea Nitrogen 5 (L) 7 - 30 mg/dL    Creatinine 0.51 (L) 0.52 - 1.04 mg/dL    GFR Estimate >90 >60 mL/min/[1.73_m2]    GFR Estimate If Black >90 >60 mL/min/[1.73_m2]    Calcium 8.8 8.5 - 10.1 mg/dL    Bilirubin Total 0.2 0.2 - 1.3 mg/dL    Albumin 3.3 (L) 3.4 - 5.0 g/dL    Protein Total 7.6 6.8 - 8.8 g/dL    Alkaline Phosphatase 77 40 - 150 U/L    ALT 19 0 - 50 U/L    AST 15 0 - 45 U/L   CBC with platelets and differential   Result Value Ref Range    WBC 3.6 (L) 4.0 - 11.0 10e9/L    RBC Count 3.76 (L) 3.8 - 5.2 10e12/L    Hemoglobin 12.0 11.7 - 15.7 g/dL    Hematocrit 36.6 35.0 - 47.0 %    MCV 97 78 - 100 fl    MCH 31.9 26.5 - 33.0 pg    MCHC 32.8 31.5 - 36.5 g/dL    RDW 13.3 10.0 - 15.0 %    Platelet Count 206 150 - 450 10e9/L    % Neutrophils 44.5 %    % Lymphocytes 44.2 %    % Monocytes 9.3 %    % Eosinophils 1.7 %    % Basophils 0.3 %    Absolute Neutrophil 1.6 1.6 - 8.3 10e9/L    Absolute Lymphocytes 1.6 0.8 - 5.3 10e9/L    Absolute Monocytes 0.3 0.0 - 1.3 10e9/L    Absolute Eosinophils 0.1 0.0 - 0.7 10e9/L    Absolute Basophils 0.0 0.0 - 0.2 10e9/L    Diff Method Automated Method    Ferritin   Result Value Ref Range    Ferritin 15 12 - 150 ng/mL   Vitamin B12   Result Value Ref Range    Vitamin B12 453 193 - 986 pg/mL   Vitamin D Deficiency   Result Value Ref Range    Vitamin D Deficiency screening 31 20 - 75 ug/L   Iron and iron binding capacity   Result Value Ref Range    Iron 74 35 - 180 ug/dL    Iron Binding Cap 272 240 - 430 ug/dL    Iron Saturation Index 27 15 - 46 %   Wet prep   Result Value Ref Range    Specimen Description Vagina     Wet Prep No Trichomonas seen      Wet Prep No clue cells seen     Wet Prep No yeast seen     Wet Prep No WBC's seen        ASSESSMENT/PLAN:       1. Gastroesophageal reflux disease without esophagitis  2. Vaginal discharge  - for concerns of h pylori, plan for more consistent treatment with Zantac as below, if no improvements plan to retest  - accompanying abdominal with no significant exam findings at this time   - work up for vaginal discharge is negative   - plan to monitor with close follow up if symptoms worsen or fail to improve  - ranitidine (ZANTAC) 150 MG tablet; Take 1 tablet (150 mg) by mouth 2 times daily  Dispense: 60 tablet; Refill: 11  - Wet prep    3. Fatigue, unspecified type  - work up as below with normal /stable findings, follow up if symptoms worsen   - Comprehensive metabolic panel  - CBC with platelets and differential  - Ferritin  - Vitamin B12  - Vitamin D Deficiency  - Iron and iron binding capacity      Dominga Foster PA-C  [facility]

## 2019-04-16 LAB — DEPRECATED CALCIDIOL+CALCIFEROL SERPL-MC: 31 UG/L (ref 20–75)

## 2019-09-10 ENCOUNTER — OFFICE VISIT (OUTPATIENT)
Dept: INTERNAL MEDICINE | Facility: CLINIC | Age: 44
End: 2019-09-10
Payer: COMMERCIAL

## 2019-09-10 VITALS
BODY MASS INDEX: 29.78 KG/M2 | RESPIRATION RATE: 16 BRPM | DIASTOLIC BLOOD PRESSURE: 70 MMHG | SYSTOLIC BLOOD PRESSURE: 115 MMHG | TEMPERATURE: 98.6 F | OXYGEN SATURATION: 100 % | WEIGHT: 184.5 LBS | HEART RATE: 85 BPM

## 2019-09-10 DIAGNOSIS — R39.15 URINARY URGENCY: ICD-10-CM

## 2019-09-10 DIAGNOSIS — R10.2 PELVIC PAIN IN FEMALE: ICD-10-CM

## 2019-09-10 DIAGNOSIS — N89.8 VAGINAL DISCHARGE: Primary | ICD-10-CM

## 2019-09-10 DIAGNOSIS — R07.0 THROAT PAIN: ICD-10-CM

## 2019-09-10 DIAGNOSIS — K21.9 GASTROESOPHAGEAL REFLUX DISEASE WITHOUT ESOPHAGITIS: ICD-10-CM

## 2019-09-10 DIAGNOSIS — E61.1 IRON DEFICIENCY: ICD-10-CM

## 2019-09-10 DIAGNOSIS — R51.9 NONINTRACTABLE HEADACHE, UNSPECIFIED CHRONICITY PATTERN, UNSPECIFIED HEADACHE TYPE: ICD-10-CM

## 2019-09-10 DIAGNOSIS — E55.9 VITAMIN D DEFICIENCY: ICD-10-CM

## 2019-09-10 LAB
ALBUMIN UR-MCNC: NEGATIVE MG/DL
APPEARANCE UR: CLEAR
BACTERIA #/AREA URNS HPF: ABNORMAL /HPF
BILIRUB UR QL STRIP: NEGATIVE
COLOR UR AUTO: YELLOW
GLUCOSE UR STRIP-MCNC: NEGATIVE MG/DL
HGB UR QL STRIP: NEGATIVE
KETONES UR STRIP-MCNC: NEGATIVE MG/DL
LEUKOCYTE ESTERASE UR QL STRIP: ABNORMAL
NITRATE UR QL: NEGATIVE
NON-SQ EPI CELLS #/AREA URNS LPF: ABNORMAL /LPF
PH UR STRIP: 5 PH (ref 5–7)
RBC #/AREA URNS AUTO: ABNORMAL /HPF
SOURCE: ABNORMAL
SP GR UR STRIP: <=1.005 (ref 1–1.03)
SPECIMEN SOURCE: NORMAL
UROBILINOGEN UR STRIP-ACNC: 0.2 EU/DL (ref 0.2–1)
WBC #/AREA URNS AUTO: ABNORMAL /HPF
WET PREP SPEC: NORMAL

## 2019-09-10 PROCEDURE — 81001 URINALYSIS AUTO W/SCOPE: CPT | Performed by: PHYSICIAN ASSISTANT

## 2019-09-10 PROCEDURE — 99213 OFFICE O/P EST LOW 20 MIN: CPT | Performed by: PHYSICIAN ASSISTANT

## 2019-09-10 PROCEDURE — 87210 SMEAR WET MOUNT SALINE/INK: CPT | Performed by: PHYSICIAN ASSISTANT

## 2019-09-10 RX ORDER — ACETAMINOPHEN 500 MG
1000 TABLET ORAL EVERY 6 HOURS PRN
Qty: 50 TABLET | Refills: 0 | Status: SHIPPED | OUTPATIENT
Start: 2019-09-10 | End: 2023-04-12

## 2019-09-10 RX ORDER — SUMATRIPTAN 50 MG/1
50 TABLET, FILM COATED ORAL
Qty: 9 TABLET | Refills: 1 | Status: SHIPPED | OUTPATIENT
Start: 2019-09-10 | End: 2022-12-11

## 2019-09-10 RX ORDER — FERROUS SULFATE 325(65) MG
325 TABLET ORAL
Qty: 90 TABLET | Refills: 2 | Status: SHIPPED | OUTPATIENT
Start: 2019-09-10 | End: 2023-04-12

## 2019-09-10 NOTE — PROGRESS NOTES
Subjective     Tye Morales is a 44 year old female who presents to clinic today for the following health issues:    HPI   URINARY TRACT SYMPTOMS      Duration: x2-3 weeks    Description  Urgency  - denies frequency and burning    Has vaginal discharge and itching and odor  Notes pelvic pain, achy pain x 2-3 weeks     Intensity:  Pelvis pain    Accompanying signs and symptoms:  Fever/chills: no   Flank pain no   Nausea and vomiting: no   Vaginal symptoms: discharge and itching  Abdominal/Pelvic Pain: YES- pelvic    History  History of frequent UTI's: no   History of kidney stones: no   Sexually Active: YES  Possibility of pregnancy: No    Precipitating or alleviating factors: None    Therapies tried and outcome: none         Reviewed and updated as needed this visit by Provider  Meds  Problems             Objective    /70 (BP Location: Right arm, Patient Position: Chair, Cuff Size: Adult Regular)   Pulse 85   Temp 98.6  F (37  C) (Oral)   Resp 16   Wt 83.7 kg (184 lb 8 oz)   LMP 08/22/2019   SpO2 100%   BMI 29.78 kg/m    Body mass index is 29.78 kg/m .  Physical Exam   GENERAL: healthy, alert and no distress  RESP: lungs clear to auscultation - no rales, rhonchi or wheezes  CV: regular rates and rhythm, normal S1 S2, no S3 or S4 and no murmur, click or rub  ABDOMEN: soft, suprapubic tenderness, no hepatosplenomegaly, no masses and bowel sounds normal    Diagnostic Test Results:  Labs reviewed in Epic  Results for orders placed or performed in visit on 09/10/19   UA reflex to Microscopic and Culture   Result Value Ref Range    Color Urine Yellow     Appearance Urine Clear     Glucose Urine Negative NEG^Negative mg/dL    Bilirubin Urine Negative NEG^Negative    Ketones Urine Negative NEG^Negative mg/dL    Specific Gravity Urine <=1.005 1.003 - 1.035    Blood Urine Negative NEG^Negative    pH Urine 5.0 5.0 - 7.0 pH    Protein Albumin Urine Negative NEG^Negative mg/dL    Urobilinogen Urine 0.2 0.2 -  1.0 EU/dL    Nitrite Urine Negative NEG^Negative    Leukocyte Esterase Urine Trace (A) NEG^Negative    Source Midstream Urine    Urine Microscopic   Result Value Ref Range    WBC Urine 0 - 5 OTO5^0 - 5 /HPF    RBC Urine O - 2 OTO2^O - 2 /HPF    Squamous Epithelial /LPF Urine Few FEW^Few /LPF    Bacteria Urine Few (A) NEG^Negative /HPF   Wet prep   Result Value Ref Range    Specimen Description Vagina     Wet Prep No Trichomonas seen     Wet Prep No clue cells seen     Wet Prep No yeast seen     Wet Prep WBC'S seen  Few              Assessment & Plan     1. Vitamin D deficiency  - refill requested   - Calcium Carb-Cholecalciferol (CALCIUM 1000 + D) 1000-800 MG-UNIT TABS; Take 1 tablet by mouth daily TAKE WITH FOOD, FOR BONE HEALTH AND LOW VITAMIN D ([facility])  Dispense: 100 tablet; Refill: PRN    2. Iron deficiency  - refill requested   - ferrous sulfate (FEROSUL) 325 (65 Fe) MG tablet; Take 1 tablet (325 mg) by mouth daily (with breakfast) IRON SUPPLEMENT, TAKE WITH 4 OZ OF APPLE JUICE  Dispense: 90 tablet; Refill: 2    3. Nonintractable headache, unspecified chronicity pattern, unspecified headache type  - refill requested denies changes   - SUMAtriptan (IMITREX) 50 MG tablet; Take 1 tablet (50 mg) by mouth at onset of headache for migraine May repeat in 2 hours. Max 4 tablets/24 hours.  Dispense: 9 tablet; Refill: 1    4. Throat pain  - refill requested   - acetaminophen (TYLENOL) 500 MG tablet; Take 2 tablets (1,000 mg) by mouth every 6 hours as needed for mild pain  Dispense: 50 tablet; Refill: 0    5. Vaginal discharge  - work up as below   - Wet prep  - Urine Microscopic    6. Urinary urgency  - work up as below   - UA reflex to Microscopic and Culture    7. Pelvic pain in female  - referral for further evaluation based on symptoms   - OB/GYN REFERRAL    8. Gastroesophageal reflux disease without esophagitis  - refill requested   - omeprazole (PRILOSEC) 20 MG DR capsule; Take 1 capsule  (20 mg) by mouth daily  Dispense: 90 capsule; Refill: 3     No follow-ups on file.    Dominga Foster PA-C  [facility]

## 2019-10-08 ENCOUNTER — TELEPHONE (OUTPATIENT)
Dept: INTERNAL MEDICINE | Facility: CLINIC | Age: 44
End: 2019-10-08

## 2019-10-08 NOTE — TELEPHONE ENCOUNTER
Reason for Call: Request for an order or referral:    Order or referral being requested: TB    Date needed: as soon as possible    Has the patient been seen by the PCP for this problem? 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     Phone number Patient can be reached at:  Cell number on file:    Telephone Information:   Mobile [phone]       Best Time:  Anytime      Can we leave a detailed message on this number?  YES    Call taken on 10/8/2019 at 1:05 PM by Kenia Palacios

## 2020-08-07 ENCOUNTER — OFFICE VISIT (OUTPATIENT)
Dept: INTERNAL MEDICINE | Facility: CLINIC | Age: 45
End: 2020-08-07

## 2020-08-07 VITALS
RESPIRATION RATE: 16 BRPM | OXYGEN SATURATION: 99 % | HEART RATE: 74 BPM | WEIGHT: 188 LBS | SYSTOLIC BLOOD PRESSURE: 100 MMHG | DIASTOLIC BLOOD PRESSURE: 64 MMHG | BODY MASS INDEX: 30.34 KG/M2 | TEMPERATURE: 96.7 F

## 2020-08-07 DIAGNOSIS — R30.0 DYSURIA: ICD-10-CM

## 2020-08-07 DIAGNOSIS — E61.1 IRON DEFICIENCY: ICD-10-CM

## 2020-08-07 DIAGNOSIS — N30.00 ACUTE CYSTITIS WITHOUT HEMATURIA: Primary | ICD-10-CM

## 2020-08-07 LAB
ALBUMIN UR-MCNC: NEGATIVE MG/DL
APPEARANCE UR: CLEAR
BACTERIA #/AREA URNS HPF: ABNORMAL /HPF
BILIRUB UR QL STRIP: NEGATIVE
COLOR UR AUTO: YELLOW
ERYTHROCYTE [DISTWIDTH] IN BLOOD BY AUTOMATED COUNT: 13.6 % (ref 10–15)
FERRITIN SERPL-MCNC: 22 NG/ML (ref 12–150)
GLUCOSE UR STRIP-MCNC: NEGATIVE MG/DL
HCT VFR BLD AUTO: 37.1 % (ref 35–47)
HGB BLD-MCNC: 11.9 G/DL (ref 11.7–15.7)
HGB UR QL STRIP: NEGATIVE
IRON SATN MFR SERPL: 18 % (ref 15–46)
IRON SERPL-MCNC: 48 UG/DL (ref 35–180)
KETONES UR STRIP-MCNC: NEGATIVE MG/DL
LEUKOCYTE ESTERASE UR QL STRIP: NEGATIVE
MCH RBC QN AUTO: 31.5 PG (ref 26.5–33)
MCHC RBC AUTO-ENTMCNC: 32.1 G/DL (ref 31.5–36.5)
MCV RBC AUTO: 98 FL (ref 78–100)
MUCOUS THREADS #/AREA URNS LPF: PRESENT /LPF
NITRATE UR QL: NEGATIVE
NON-SQ EPI CELLS #/AREA URNS LPF: ABNORMAL /LPF
PH UR STRIP: 5 PH (ref 5–7)
PLATELET # BLD AUTO: 209 10E9/L (ref 150–450)
RBC # BLD AUTO: 3.78 10E12/L (ref 3.8–5.2)
RBC #/AREA URNS AUTO: ABNORMAL /HPF
SOURCE: ABNORMAL
SP GR UR STRIP: >1.03 (ref 1–1.03)
TIBC SERPL-MCNC: 271 UG/DL (ref 240–430)
UROBILINOGEN UR STRIP-ACNC: 0.2 EU/DL (ref 0.2–1)
WBC # BLD AUTO: 2.8 10E9/L (ref 4–11)
WBC #/AREA URNS AUTO: ABNORMAL /HPF

## 2020-08-07 PROCEDURE — 99213 OFFICE O/P EST LOW 20 MIN: CPT | Performed by: PHYSICIAN ASSISTANT

## 2020-08-07 PROCEDURE — 36415 COLL VENOUS BLD VENIPUNCTURE: CPT | Performed by: PHYSICIAN ASSISTANT

## 2020-08-07 PROCEDURE — 83540 ASSAY OF IRON: CPT | Performed by: PHYSICIAN ASSISTANT

## 2020-08-07 PROCEDURE — 85027 COMPLETE CBC AUTOMATED: CPT | Performed by: PHYSICIAN ASSISTANT

## 2020-08-07 PROCEDURE — 83550 IRON BINDING TEST: CPT | Performed by: PHYSICIAN ASSISTANT

## 2020-08-07 PROCEDURE — 81001 URINALYSIS AUTO W/SCOPE: CPT | Performed by: PHYSICIAN ASSISTANT

## 2020-08-07 PROCEDURE — 82728 ASSAY OF FERRITIN: CPT | Performed by: PHYSICIAN ASSISTANT

## 2020-08-07 RX ORDER — SULFAMETHOXAZOLE/TRIMETHOPRIM 800-160 MG
1 TABLET ORAL 2 TIMES DAILY
Qty: 10 TABLET | Refills: 0 | Status: SHIPPED | OUTPATIENT
Start: 2020-08-07 | End: 2022-12-11

## 2020-08-07 NOTE — PROGRESS NOTES
Subjective     Tye Morales is a 45 year old female who presents to clinic today for the following health issues:    HPI       URINARY TRACT SYMPTOMS  Onset: 1 month    Description:   Painful urination (Dysuria): YES- burning  Urgency: YES           Frequency: no   Blood in urine (Hematuria): no   Delay in urine (Hesitency): YES- sometimes    Intensity: moderate    Progression of Symptoms:  worsening    Accompanying Signs & Symptoms:  Fever/chills: no   Flank pain no   Nausea and vomiting: no   Any vaginal symptoms: vaginal discharge- white, occasional smell.   No vaginal itching or irritation   Abdominal/Pelvic Pain: YES    History:   History of frequent UTI's: YES  History of kidney stones: no   Sexually Active: YES  Possibility of pregnancy: No    Precipitating factors:   None    Therapies Tried and outcome: None  -------------------------------------    BP Readings from Last 3 Encounters:   08/07/20 100/64   09/10/19 115/70   04/15/19 106/68    Wt Readings from Last 3 Encounters:   08/07/20 85.3 kg (188 lb)   09/10/19 83.7 kg (184 lb 8 oz)   04/15/19 83.9 kg (185 lb)                    Reviewed and updated as needed this visit by Provider         Review of Systems   Constitutional, HEENT, cardiovascular, pulmonary, gi and gu systems are negative, except as otherwise noted.      Objective    /64 (BP Location: Left arm, Patient Position: Chair, Cuff Size: Adult Regular)   Pulse 74   Temp 96.7  F (35.9  C) (Temporal)   Resp 16   Wt 85.3 kg (188 lb)   SpO2 99%   BMI 30.34 kg/m    Body mass index is 30.34 kg/m .  Physical Exam   GENERAL: healthy, alert and no distress  RESP: lungs clear to auscultation - no rales, rhonchi or wheezes  CV: regular rate and rhythm, normal S1 S2, no S3 or S4, no murmur, click or rub, no peripheral edema and peripheral pulses strong  ABDOMEN: tenderness suprapubic and bowel sounds normal  SKIN: no suspicious lesions or rashes    Diagnostic Test Results:  Results for orders  placed or performed in visit on 08/07/20 (from the past 24 hour(s))   UA with Microscopic reflex to Culture    Specimen: Midstream Urine   Result Value Ref Range    Color Urine Yellow     Appearance Urine Clear     Glucose Urine Negative NEG^Negative mg/dL    Bilirubin Urine Negative NEG^Negative    Ketones Urine Negative NEG^Negative mg/dL    Specific Gravity Urine >1.030 1.003 - 1.035    pH Urine 5.0 5.0 - 7.0 pH    Protein Albumin Urine Negative NEG^Negative mg/dL    Urobilinogen Urine 0.2 0.2 - 1.0 EU/dL    Nitrite Urine Negative NEG^Negative    Blood Urine Negative NEG^Negative    Leukocyte Esterase Urine Negative NEG^Negative    Source Midstream Urine     WBC Urine 0 - 5 OTO5^0 - 5 /HPF    RBC Urine O - 2 OTO2^O - 2 /HPF    Squamous Epithelial /LPF Urine Many (A) FEW^Few /LPF    Bacteria Urine Few (A) NEG^Negative /HPF    Mucous Urine Present (A) NEG^Negative /LPF           Assessment & Plan     1. Acute cystitis without hematuria  Contaminated sample, given symptoms and exam with treat empirically   If not better then recheck in clinic with pelvic exam   - sulfamethoxazole-trimethoprim (BACTRIM DS) 800-160 MG tablet; Take 1 tablet by mouth 2 times daily  Dispense: 10 tablet; Refill: 0    2. Iron deficiency  Requesting labs as she is here today  Off all supplements   - CBC with platelets  - Iron and iron binding capacity  - Ferritin    3. Dysuria    - UA with Microscopic reflex to Culture  - sulfamethoxazole-trimethoprim (BACTRIM DS) 800-160 MG tablet; Take 1 tablet by mouth 2 times daily  Dispense: 10 tablet; Refill: 0           Return in about 3 months (around 11/7/2020) for Physical Exam, regular primary provider.    Elizabeth Talavera PA-C  [facility]

## 2020-08-10 RX ORDER — PRENATAL VIT/IRON FUM/FOLIC AC 27MG-0.8MG
1 TABLET ORAL DAILY
Qty: 90 TABLET | Refills: 3 | Status: SHIPPED | OUTPATIENT
Start: 2020-08-10 | End: 2023-09-19

## 2020-09-22 DIAGNOSIS — K21.9 GASTROESOPHAGEAL REFLUX DISEASE WITHOUT ESOPHAGITIS: ICD-10-CM

## 2021-12-27 ENCOUNTER — OFFICE VISIT (OUTPATIENT)
Dept: URGENT CARE | Facility: URGENT CARE | Age: 46
End: 2021-12-27
Payer: COMMERCIAL

## 2021-12-27 VITALS
SYSTOLIC BLOOD PRESSURE: 135 MMHG | HEART RATE: 110 BPM | DIASTOLIC BLOOD PRESSURE: 84 MMHG | RESPIRATION RATE: 18 BRPM | TEMPERATURE: 98.2 F | OXYGEN SATURATION: 100 %

## 2021-12-27 DIAGNOSIS — R05.9 COUGH: ICD-10-CM

## 2021-12-27 DIAGNOSIS — J06.9 UPPER RESPIRATORY TRACT INFECTION, UNSPECIFIED TYPE: Primary | ICD-10-CM

## 2021-12-27 LAB
DEPRECATED S PYO AG THROAT QL EIA: NEGATIVE
FLUAV AG SPEC QL IA: NEGATIVE
FLUBV AG SPEC QL IA: NEGATIVE
GROUP A STREP BY PCR: NOT DETECTED

## 2021-12-27 PROCEDURE — 99213 OFFICE O/P EST LOW 20 MIN: CPT | Performed by: FAMILY MEDICINE

## 2021-12-27 PROCEDURE — U0005 INFEC AGEN DETEC AMPLI PROBE: HCPCS | Performed by: FAMILY MEDICINE

## 2021-12-27 PROCEDURE — 87804 INFLUENZA ASSAY W/OPTIC: CPT | Performed by: FAMILY MEDICINE

## 2021-12-27 PROCEDURE — 87651 STREP A DNA AMP PROBE: CPT | Performed by: FAMILY MEDICINE

## 2021-12-27 PROCEDURE — U0003 INFECTIOUS AGENT DETECTION BY NUCLEIC ACID (DNA OR RNA); SEVERE ACUTE RESPIRATORY SYNDROME CORONAVIRUS 2 (SARS-COV-2) (CORONAVIRUS DISEASE [COVID-19]), AMPLIFIED PROBE TECHNIQUE, MAKING USE OF HIGH THROUGHPUT TECHNOLOGIES AS DESCRIBED BY CMS-2020-01-R: HCPCS | Performed by: FAMILY MEDICINE

## 2021-12-27 RX ORDER — NORETHINDRONE ACETATE/ETHINYL ESTRADIOL AND FERROUS FUMARATE 1MG-20(21)
KIT ORAL
COMMUNITY
Start: 2021-03-11 | End: 2023-04-12

## 2021-12-27 NOTE — PATIENT INSTRUCTIONS
"Discharge Instructions for COVID-19 Patients  You have--or may have--COVID-19. Please follow the instructions listed below.   If you have a weakened immune system, discuss with your doctor any other actions you need to take.  How can I protect others?  If you have symptoms (fever, cough, body aches or trouble breathing):    Stay home and away from others (self-isolate) until:  ? Your other symptoms have resolved (gotten better). And   ? You've had no fever--and no medicine that reduces fever--for 1 full day (24 hours). And   ? At least 10 days have passed since your symptoms started. (You may need to wait 20 days. Follow the advice of your care team.)  If you don't show symptoms, but testing showed that you have COVID-19:    Stay home and away from others (self-isolate) until at least 10 days have passed since the date of your first positive COVID-19 test.  During this time    Stay in your own room, even for meals. Use your own bathroom if you can.    Stay away from others in your home. No hugging, kissing or shaking hands. No visitors.    Don't go to work, school or anywhere else.    Clean \"high touch\" surfaces often (doorknobs, counters, handles). Use household cleaning spray or wipes.    You'll find a full list of  on the EPA website: www.epa.gov/pesticide-registration/list-n-disinfectants-use-against-sars-cov-2.    Cover your mouth and nose with a mask or other face covering to avoid spreading germs.    Wash your hands and face often. Use soap and water.    Caregivers in these groups are at risk for severe illness due to COVID-19:  ? People 65 years and older  ? People who live in a nursing home or long-term care facility  ? People with chronic disease (lung, heart, cancer, diabetes, kidney, liver, immunologic)  ? People who have a weakened immune system, including those who:    Are in cancer treatment    Take medicine that weakens the immune system, such as corticosteroids    Had a bone marrow or organ " transplant    Have an immune deficiency    Have poorly controlled HIV or AIDS    Are obese (body mass index of 40 or higher)    Smoke regularly    Caregivers should wear gloves while washing dishes, handling laundry and cleaning bedrooms and bathrooms.    Use caution when washing and drying laundry: Don't shake dirty laundry and use the warmest water setting that you can.    For more tips on managing your health at home, go to www.cdc.gov/coronavirus/2019-ncov/downloads/10Things.pdf.  How can I take care of myself at home?  1. Get lots of rest. Drink extra fluids (unless a doctor has told you not to).  2. Take Tylenol (acetaminophen) for fever or pain. If you have liver or kidney problems, ask your family doctor if it's okay to take Tylenol.   Adults can take either:   ? 650 mg (two 325 mg pills) every 4 to 6 hours, or   ? 1,000 mg (two 500 mg pills) every 8 hours as needed.  ? Note: Don't take more than 3,000 mg in one day. Acetaminophen is found in many medicines (both prescribed and over-the-counter medicines). Read all labels to be sure you don't take too much.   For children, check the Tylenol bottle for the right dose. The dose is based on the child's age or weight.  3. If you have other health problems (like cancer, heart failure, an organ transplant or severe kidney disease): Call your specialty clinic if you don't feel better in the next 2 days.  4. Know when to call 911. Emergency warning signs include:  ? Trouble breathing or shortness of breath  ? Pain or pressure in the chest that doesn't go away  ? Feeling confused like you haven't felt before, or not being able to wake up  ? Bluish-colored lips or face  5. Your doctor may have prescribed a blood thinner medicine. Follow their instructions.  Where can I get more information?     [facility] [place] - About COVID-19:   [email or web address]    CDC - What to Do If You're Sick:  www.cdc.gov/coronavirus/2019-ncov/about/steps-when-sick.html    CDC - Ending Home Isolation: www.cdc.gov/coronavirus/2019-ncov/hcp/disposition-in-home-patients.html    CDC - Caring for Someone: www.cdc.gov/coronavirus/2019-ncov/if-you-are-sick/care-for-someone.html    [facility] - Interim Guidance for Hospital Discharge to Home: www.health.[facility].[place]./diseases/coronavirus/hcp/hospdischarge.pdf    Below are the COVID-19 hotlines at the [place] Department of Health ([facility]). Interpreters are available.  ? For health questions: Call 920-274-8669 or 1-998.899.8572 (7 a.m. to 7 p.m.)  ? For questions about schools and childcare: Call 534-172-7716 or 1-223.880.7562 (7 a.m. to 7 p.m.)    For informational purposes only. Not to replace the advice of your health care provider. Clinically reviewed by Dr. Dell Bartlett.   Copyright   2020 [facility]. All rights reserved. Beeline 497320 - REV 01/05/21.

## 2021-12-27 NOTE — LETTER
[facility]TAZAR [facility]  [address] [address]  [phone]      December 27, 2021    RE:  Tye Morales                                                                                                                                                       [id number]  [facility] [address]            To whom it may concern:    Tye Morales is under my professional care for Medical.   She  may return to work with the following: No working or lifting restrictions on or about after negative COVID test.          Sincerely,        DO BALTAZAR [facility]

## 2021-12-27 NOTE — PROGRESS NOTES
SUBJECTIVE: Tye Morales is a 46 year old female presenting with a chief complaint of fever and cough .  Onset of symptoms was 4 day(s) ago.  Course of illness is worsening.    Severity moderate  Current and Associated symptoms: sore throat  Treatment measures tried include Tylenol/Ibuprofen.  Predisposing factors include None.    Past Medical History:   Diagnosis Date     Abdominal pain, epigastric      Abnormal Pap smear     colposcopy- has since been normal     Anemia      Constipation      Gastro-oesophageal reflux disease      Helicobacter pylori (H. pylori) 8-2007     History of colposcopy with cervical biopsy 7/15/11    [place]. Scan only- no biopsies     LSIL (low grade squamous intraepithelial lesion) on Pap smear 6/17/11     Neutropenia associated with autoimmune disease (H) 2007    Saw hematologist     Allergies   Allergen Reactions     Nkda [No Known Drug Allergies]      Social History     Tobacco Use     Smoking status: Never Smoker     Smokeless tobacco: Never Used   Substance Use Topics     Alcohol use: No       ROS:  SKIN: no rash  GI: no vomiting    OBJECTIVE:  /84   Pulse 110   Temp 98.2  F (36.8  C) (Oral)   Resp 18   SpO2 100% GENERAL APPEARANCE: healthy, alert and no distress  EYES: EOMI,  PERRL, conjunctiva clear  HENT: ear canals and TM's normal.  Nose and mouth without ulcers, erythema or lesions  RESP: lungs clear to auscultation - no rales, rhonchi or wheezes  SKIN: no suspicious lesions or rashes      ICD-10-CM    1. Upper respiratory tract infection, unspecified type  J06.9    2. Cough  R05.9 Streptococcus A Rapid Screen w/Reflex to PCR     Symptomatic; Yes; 12/20/2021 COVID-19 Virus (Coronavirus) by PCR Nose     Influenza A/B antigen     Group A Streptococcus PCR Throat Swab       Fluids/Rest, f/u if worse/not any better

## 2021-12-28 ENCOUNTER — TELEPHONE (OUTPATIENT)
Dept: URGENT CARE | Facility: URGENT CARE | Age: 46
End: 2021-12-28
Payer: COMMERCIAL

## 2021-12-28 LAB — SARS-COV-2 RNA RESP QL NAA+PROBE: POSITIVE

## 2021-12-28 NOTE — TELEPHONE ENCOUNTER
Coronavirus (COVID-19) Notification    Reason for call  Notify of POSITIVE  COVID-19 lab result, assess symptoms,  review [facility] recommendations    Lab Result   Lab test for 2019-nCoV rRt-PCR or SARS-COV-2 PCR  Oropharyngeal AND/OR nasopharyngeal swabs were POSITIVE for 2019-nCoV RNA [OR] SARS-COV-2 RNA (COVID-19) RNA     We have been unable to reach Patient by phone at this time to notify of their Positive COVID-19 result.  Left voicemail message requesting a call back to [phone] [facility] for results.        POSITIVE COVID-19 Letter sent.    Cassandra Montelongo LPN

## 2021-12-28 NOTE — TELEPHONE ENCOUNTER
"-Coronavirus (COVID-19) Notification    Caller Name (Patient, parent, daughter/son, grandparent, etc)  Haifa, daughter  Patient gave permission to discuss results with daughter.  Reason for call  Notify of Positive Coronavirus (COVID-19) lab results, assess symptoms,  review [facility] recommendations    Lab Result    Lab test:  2019-nCoV rRt-PCR or SARS-CoV-2 PCR    Oropharyngeal AND/OR nasopharyngeal swabs is POSITIVE for 2019-nCoV RNA/SARS-COV-2 PCR (COVID-19 virus)    RN Recommendations/Instructions per  [email or web address] [facility] Coronavirus COVID-19 recommendations    Brief introduction script  Introduce self then review script:  \"I am calling on behalf of [employer].  We were notified that your Coronavirus test (COVID-19) for was POSITIVE for the virus.  I have some information to relay to you but first I wanted to mention that the [place] Dept of Health will be contacting you shortly [it's possible MD already called Patient] to talk to you more about how you are feeling and other people you have had contact with who might now also have the virus.  Also, [employer] [facility] is Partnering with the [facility] for Covid-19 research, you may be contacted directly by research staff.\"    Assessment (Inquire about Patient's current symptoms)   Assessment   Current Symptoms at time of phone call: (if no symptoms, document No symptoms] No symptoms   Symptoms onset (if applicable) 12/23/2021     If at time of call, Patients symptoms hare worsened, the Patient should contact 911 or have someone drive them to Emergency Dept promptly:      If Patient calling 911, inform 911 personal that you have tested positive for the Coronavirus (COVID-19).  Place mask on and await 911 to arrive.    If Emergency Dept, If possible, please have another adult drive you to the Emergency Dept but you need to wear mask when in contact with other people.      Monoclonal Antibody Administration    You may be eligible to receive a new " "treatment with a monoclonal antibody for preventing hospitalization in patients at high risk for complications from COVID-19.   This medication is still experimental and available on a limited basis; it is given through an IV and must be given at an infusion center. Please note that not all people who are eligible will receive the medication since it is in limited supply.     Are you interested in being considered for this medication?  No.   Does the patient fit the criteria: No    If patient qualifies based on above criteria:  \"You will be contacted if you are selected to receive this treatment in the next 1-2 business days.   This is time sensitive and if you are not selected in the next 1-2 business days, you will not receive the medication.  If you do not receive a call to schedule, you have not been selected.\"      Review information with Patient    Your result was positive. This means you have COVID-19 (coronavirus).  We have sent you a letter that reviews the information that I'll be reviewing with you now.    How can I protect others?    If you have symptoms: stay home and away from others (self-isolate) until:    You've had no fever--and no medicine that reduces fever--for 1 full day (24 hours). And       Your other symptoms have gotten better. For example, your cough or breathing has improved. And     At least 10 days have passed since your symptoms started. (If you've been told by a doctor that you have a weak immune system, wait 20 days.)     If you don't have symptoms: Stay home and away from others (self-isolate) until at least 10 days have passed since your first positive COVID-19 test. (Date test collected)    During this time:    Stay in your own room, including for meals. Use your own bathroom if you can.    Stay away from others in your home. No hugging, kissing or shaking hands. No visitors.     Don't go to work, school or anywhere else.     Clean  high touch  surfaces often (doorknobs, counters, " handles, etc.). Use a household cleaning spray or wipes. You'll find a full list on the EPA website at www.epa.gov/pesticide-registration/list-n-disinfectants-use-against-sars-cov-2.     Cover your mouth and nose with a mask, tissue or other face covering to avoid spreading germs.    Wash your hands and face often with soap and water.    Make a list of people you have been in close contact with recently, even if either of you wore a face covering.   ; Start your list from 2 days before you became ill or had a positive test.  ; Include anyone that was within 6 feet of you for a cumulative total of 15 minutes or more in 24 hours. (Example: if you sat next to Gilmer for 5 minutes in the morning and 10 minutes in the afternoon, then you were in close contact for 15 minutes total that day. Gilmer would be added to your list.)    A public health worker will call or text you. It is important that you answer. They will ask you questions about possible exposures to COVID-19, such as people you have been in direct contact with and places you have visited.    Tell the people on your list that you have COVID-19; they should stay away from others for 14 days starting from the last time they were in contact with you (unless you are told something different from a public health worker).     Caregivers in these groups are at risk for severe illness due to COVID-19:  o People 65 years and older  o People who live in a nursing home or long-term care facility  o People with chronic disease (lung, heart, cancer, diabetes, kidney, liver, immunologic)  o People who have a weakened immune system, including those who:  - Are in cancer treatment  - Take medicine that weakens the immune system, such as corticosteroids  - Had a bone marrow or organ transplant  - Have an immune deficiency  - Have poorly controlled HIV or AIDS  - Are obese (body mass index of 40 or higher)  - Smoke regularly    Caregivers should wear gloves while washing dishes,  handling laundry and cleaning bedrooms and bathrooms.    Wash and dry laundry with special caution. Don't shake dirty laundry, and use the warmest water setting you can.    If you have a weakened immune system, ask your doctor about other actions you should take.    For more tips, go to www.cdc.gov/coronavirus/2019-ncov/downloads/10Things.pdf.    You should not go back to work until you meet the guidelines above for ending your home isolation. You don't need to be retested for COVID-19 before going back to work--studies show that you won't spread the virus if it's been at least 10 days since your symptoms started (or 20 days, if you have a weak immune system).    Employers: This document serves as formal notice of your employee's medical guidelines for going back to work. They must meet the above guidelines before going back to work in person.    How can I take care of myself?    1. Get lots of rest. Drink extra fluids (unless a doctor has told you not to).    2. Take Tylenol (acetaminophen) for fever or pain. If you have liver or kidney problems, ask your family doctor if it's okay to take Tylenol.     Take either:     650 mg (two 325 mg pills) every 4 to 6 hours, or     1,000 mg (two 500 mg pills) every 8 hours as needed.     Note: Don't take more than 3,000 mg in one day. Acetaminophen is found in many medicines (both prescribed and over-the-counter medicines). Read all labels to be sure you don't take too much.    For children, check the Tylenol bottle for the right dose (based on their age or weight).    3. If you have other health problems (like cancer, heart failure, an organ transplant or severe kidney disease): Call your specialty clinic if you don't feel better in the next 2 days.    4. Know when to call 911: Emergency warning signs include:    Trouble breathing or shortness of breath    Pain or pressure in the chest that doesn't go away    Feeling confused like you haven't felt before, or not being able  to wake up    Bluish-colored lips or face    5. Sign up for [employer] We know it's scary to hear that you have COVID-19. We want to track your symptoms to make sure you're okay over the next 2 weeks. Please look for an email from [employer]--this is a free, online program that we'll use to keep in touch. To sign up, follow the link in the email. Learn more at [email or web address]."[email or web address][email or web address].    Where can I get more information?    [facility]: [email or web address]    Coronavirus Basics: www.health.[email or web address].[place]./diseases/coronavirus/basics.html    What to Do If You're Sick: www.cdc.gov/coronavirus/2019-ncov/about/steps-when-sick.html    Ending Home Isolation: www.cdc.gov/coronavirus/2019-ncov/hcp/disposition-in-home-patients.html     Caring for Someone with COVID-19: www.cdc.gov/coronavirus/2019-ncov/if-you-are-sick/care-for-someone.html     [facility] clinical trials (COVID-19 research studies): clinicalaffairs.[facility]-clinical-trials     A Positive COVID-19 letter will be sent via "[employer] or the mail. (Exception, no letters sent to Presurgerical/Preprocedure Patients)    Cassandra Montelongo LPN

## 2022-11-22 ENCOUNTER — OFFICE VISIT (OUTPATIENT)
Dept: INTERNAL MEDICINE | Facility: CLINIC | Age: 47
End: 2022-11-22
Payer: COMMERCIAL

## 2022-11-22 VITALS
TEMPERATURE: 97.9 F | OXYGEN SATURATION: 93 % | SYSTOLIC BLOOD PRESSURE: 118 MMHG | HEART RATE: 129 BPM | RESPIRATION RATE: 18 BRPM | WEIGHT: 199.5 LBS | BODY MASS INDEX: 32.2 KG/M2 | DIASTOLIC BLOOD PRESSURE: 78 MMHG

## 2022-11-22 DIAGNOSIS — E61.1 IRON DEFICIENCY: ICD-10-CM

## 2022-11-22 DIAGNOSIS — E53.8 VITAMIN B 12 DEFICIENCY: ICD-10-CM

## 2022-11-22 DIAGNOSIS — N89.8 VAGINAL DISCHARGE: Primary | ICD-10-CM

## 2022-11-22 DIAGNOSIS — E55.9 VITAMIN D DEFICIENCY: ICD-10-CM

## 2022-11-22 LAB
CLUE CELLS: NORMAL
HGB BLD-MCNC: 12.4 G/DL (ref 11.7–15.7)
TRICHOMONAS, WET PREP: NORMAL
WBC'S/HIGH POWER FIELD, WET PREP: NORMAL
YEAST, WET PREP: NORMAL

## 2022-11-22 PROCEDURE — 82306 VITAMIN D 25 HYDROXY: CPT | Performed by: INTERNAL MEDICINE

## 2022-11-22 PROCEDURE — 83550 IRON BINDING TEST: CPT | Performed by: INTERNAL MEDICINE

## 2022-11-22 PROCEDURE — 83540 ASSAY OF IRON: CPT | Performed by: INTERNAL MEDICINE

## 2022-11-22 PROCEDURE — 85018 HEMOGLOBIN: CPT | Performed by: INTERNAL MEDICINE

## 2022-11-22 PROCEDURE — 87210 SMEAR WET MOUNT SALINE/INK: CPT | Performed by: INTERNAL MEDICINE

## 2022-11-22 PROCEDURE — 99213 OFFICE O/P EST LOW 20 MIN: CPT | Performed by: INTERNAL MEDICINE

## 2022-11-22 PROCEDURE — 82607 VITAMIN B-12: CPT | Performed by: INTERNAL MEDICINE

## 2022-11-22 PROCEDURE — 36415 COLL VENOUS BLD VENIPUNCTURE: CPT | Performed by: INTERNAL MEDICINE

## 2022-11-22 RX ORDER — METRONIDAZOLE 500 MG/1
500 TABLET ORAL 2 TIMES DAILY
Qty: 14 TABLET | Refills: 0 | Status: SHIPPED | OUTPATIENT
Start: 2022-11-22 | End: 2022-11-29

## 2022-11-22 NOTE — PROGRESS NOTES
Assessment & Plan     Vaginal discharge  Advised wet prep is negative but her symptoms seem to be very suggestive of an infection so we will give a trial of metronidazole.  If not improving may need to follow-up with OB/GYN  - Wet prep - Clinic Collect  - metroNIDAZOLE (FLAGYL) 500 MG tablet; Take 1 tablet (500 mg) by mouth 2 times daily for 7 days    Iron deficiency    - Iron and iron binding capacity  - Hemoglobin    Vitamin D deficiency    - Vitamin D Deficiency    Vitamin B 12 deficiency    - Vitamin B12                 Return in about 2 months (around 1/22/2023) for Physical Exam with your primary provider.    Brook Alanis MD  [facility] BONNIE Gamble is a 47 year old, presenting for the following health issues:  She presents with complaints of vaginal itching and discharge.  Discharge is fairly thick, has a bad odor to it.  It is been present about 2 weeks she has some mild urinary urgency but no dysuria.  She has had some lower abdominal discomfort at the umbilicus and below that started about the same time as the discharge.  It comes and goes, its not sharp, it does radiate occasionally lower back.  Seems to be aggravated by bending and movements.  She reports all the symptoms are similar to symptoms she has had in the past with yeast or bacterial vaginosis including abdominal pain.    She reports no fever, chills, nausea, vomiting, diarrhea.  She does have some episodic constipation but it is stable.    Is also concerned about anemia.  She has a history of iron deficiency, is taking iron but would like to have her levels done    Patient Active Problem List   Diagnosis     External hemorrhoids     Vitamin D deficiency     LSIL (low grade squamous intraepithelial lesion) on Pap smear     GERD (gastroesophageal reflux disease)     Iron deficiency     Allergic rhinitis, unspecified allergic rhinitis type     Migraine with aura and without status migrainosus, not  intractable     Systemic lupus erythematosus, unspecified SLE type, unspecified organ involvement status (H)     Current Outpatient Medications   Medication Sig Dispense Refill     acetaminophen (TYLENOL) 500 MG tablet Take 2 tablets (1,000 mg) by mouth every 6 hours as needed for mild pain 50 tablet 0     Calcium Carb-Cholecalciferol (CALCIUM 1000 + D) 1000-800 MG-UNIT TABS Take 1 tablet by mouth daily TAKE WITH FOOD, FOR BONE HEALTH AND LOW VITAMIN D ([facility]) 100 tablet PRN     cholecalciferol (VITAMIN D3) 37759 UNITS capsule 1 CAP 2 X A WK FOR 2 WKS, THEN 1 CAP 1ST AND 15TH OF EACH MONTH, FOR VIT D DEFICIENCY 40 capsule 0     ferrous sulfate (FEROSUL) 325 (65 Fe) MG tablet Take 1 tablet (325 mg) by mouth daily (with breakfast) IRON SUPPLEMENT, TAKE WITH 4 OZ OF APPLE JUICE 90 tablet 2     Prenatal Vit-Fe Fumarate-FA (PRENATAL MULTIVITAMIN W/IRON) 27-0.8 MG tablet Take 1 tablet by mouth daily 90 tablet 3     ranitidine (ZANTAC) 150 MG tablet Take 1 tablet (150 mg) by mouth 2 times daily 60 tablet 11          Review of Systems   No fever, chills, flank pain, hematuria, dysuria, abnormal vaginal bleeding, no nausea, vomiting, diarrhea, blood in the stool or black tarry stools      Objective    /78   Pulse (!) 129   Temp 97.9  F (36.6  C) (Tympanic)   Resp 18   Wt 90.5 kg (199 lb 8 oz)   LMP 11/17/2022 (Exact Date)   SpO2 93%   BMI 32.20 kg/m    Body mass index is 32.2 kg/m .  Physical Exam     Abdomen: Nondistended, soft, mildly tender in the very low mid abdomen/pelvis, no rebound or guarding,, no hepatosplenomegaly, no masses.    Vaginal mucosa with mild erythema, thick white-grayish discharge.  Wet prep sent    Wet prep: Negative

## 2022-11-22 NOTE — LETTER
[place] [address], [address]  [place] [address]  [phone]        December 12, 2022    Tye Morales  [address]  [facility] [address]            Dear Ms. Tye Morales:    The hemoglobin level and iron levels are normal.  The vitamin D level is good.  The vitamin B12 level is elevated.  Continue your current calcium and vitamin D.  You can probably stop the iron or take it once or twice a week.  If you are taking any vitamin B12 please decrease the dose.     You should see your regular primary provider for a physical within a few months.   Please call clinic if you have any questions.       Sincerely,        Brook Alanis M.D.    Results for orders placed or performed in visit on 11/22/22   Iron and iron binding capacity     Status: Normal   Result Value Ref Range    Iron 101 37 - 145 ug/dL    Iron Sat Index 34 15 - 46 %    Iron Binding Capacity 294 240 - 430 ug/dL   Hemoglobin     Status: Normal   Result Value Ref Range    Hemoglobin 12.4 11.7 - 15.7 g/dL   Vitamin D Deficiency     Status: Normal   Result Value Ref Range    Vitamin D, Total (25-Hydroxy) 31 20 - 75 ug/L    Narrative    Season, race, dietary intake, and treatment affect the concentration of 25-hydroxy-Vitamin D. Values may decrease during winter months and increase during summer months. Values 20-29 ug/L may indicate Vitamin D insufficiency and values <20 ug/L may indicate Vitamin D deficiency.    Vitamin D determination is routinely performed by an immunoassay specific for 25 hydroxyvitamin D3.  If an individual is on vitamin D2(ergocalciferol) supplementation, please specify 25 OH vitamin D2 and D3 level determination by LCMSMS test VITD23.     Vitamin B12     Status: Abnormal   Result Value Ref Range    Vitamin B12 1,382 (H) 232 - 1,245 pg/mL   Wet prep - Clinic Collect     Status: Normal    Specimen: Vagina; Swab   Result Value Ref Range    Trichomonas Absent Absent    Yeast Absent Absent     Clue Cells Absent Absent    WBCs/high power field None None

## 2022-11-23 LAB
DEPRECATED CALCIDIOL+CALCIFEROL SERPL-MC: 31 UG/L (ref 20–75)
IRON BINDING CAPACITY (ROCHE): 294 UG/DL (ref 240–430)
IRON SATN MFR SERPL: 34 % (ref 15–46)
IRON SERPL-MCNC: 101 UG/DL (ref 37–145)
VIT B12 SERPL-MCNC: 1382 PG/ML (ref 232–1245)

## 2022-12-01 ENCOUNTER — NURSE TRIAGE (OUTPATIENT)
Dept: INTERNAL MEDICINE | Facility: CLINIC | Age: 47
End: 2022-12-01

## 2022-12-01 NOTE — TELEPHONE ENCOUNTER
"Patient called with CC of chest pain and upper back pain:    Onset: One week ago  Location: Pain in upper back and chest  Pattern: constant  Duration: one week  Severity: 5/10. Moderate, makes it difficult to breath at times  Cardiac: none  Lung: none  Cause: patient is unsure of the cause  Other symptoms: Breathing difficulty, \"Sometimes I feel a cough\"  Denies dizziness, n/v, diarrhea, sweating, fever    Disposition:  Call  Now. Writer notified patient that they should be seen in ED now due to chest pain lasting >5minutes and age >44 years old, as well as breathing difficulties. Patient stated that she plans to go to the ED tomorrow, especially since it has been going on for a week now. Writer reiterated the importance of being seen in the emergency room immediately. Patient expressed verbal understanding and stated that she plans to go to the ED today.    Becky Hackett RN  [facility]        Reason for Disposition    Chest pain lasting longer than 5 minutes and ANY of the following:* Over 44 years old* Over 30 years old and at least one cardiac risk factor (e.g., diabetes mellitus, high blood pressure, high cholesterol, smoker, or strong family history of heart disease)* History of heart disease (i.e., angina, heart attack, heart failure, bypass surgery, takes nitroglycerin)* Pain is crushing, pressure-like, or heavy    Additional Information    Negative: SEVERE difficulty breathing (e.g., struggling for each breath, speaks in single words)    Negative: Passed out (i.e., fainted, collapsed and was not responding)    Negative: Difficult to awaken or acting confused (e.g., disoriented, slurred speech)    Negative: Shock suspected (e.g., cold/pale/clammy skin, too weak to stand, low BP, rapid pulse)    Protocols used: CHEST PAIN-A-OH      "

## 2022-12-02 ENCOUNTER — HOSPITAL ENCOUNTER (EMERGENCY)
Facility: CLINIC | Age: 47
Discharge: HOME | End: 2022-12-02
Attending: EMERGENCY MEDICINE | Admitting: EMERGENCY MEDICINE
Payer: COMMERCIAL

## 2022-12-02 ENCOUNTER — APPOINTMENT (OUTPATIENT)
Dept: GENERAL RADIOLOGY | Facility: CLINIC | Age: 47
End: 2022-12-02
Attending: EMERGENCY MEDICINE
Payer: COMMERCIAL

## 2022-12-02 VITALS
TEMPERATURE: 98.6 F | SYSTOLIC BLOOD PRESSURE: 142 MMHG | RESPIRATION RATE: 20 BRPM | OXYGEN SATURATION: 100 % | HEART RATE: 74 BPM | DIASTOLIC BLOOD PRESSURE: 81 MMHG

## 2022-12-02 DIAGNOSIS — R07.9 CHEST PAIN, UNSPECIFIED TYPE: ICD-10-CM

## 2022-12-02 LAB
ANION GAP SERPL CALCULATED.3IONS-SCNC: 11 MMOL/L (ref 7–15)
ATRIAL RATE - MUSE: 79 BPM
BASOPHILS # BLD AUTO: 0 10E3/UL (ref 0–0.2)
BASOPHILS NFR BLD AUTO: 1 %
BUN SERPL-MCNC: 10.7 MG/DL (ref 6–20)
CALCIUM SERPL-MCNC: 8.9 MG/DL (ref 8.6–10)
CHLORIDE SERPL-SCNC: 104 MMOL/L (ref 98–107)
CREAT SERPL-MCNC: 0.51 MG/DL (ref 0.51–0.95)
DEPRECATED HCO3 PLAS-SCNC: 22 MMOL/L (ref 22–29)
DIASTOLIC BLOOD PRESSURE - MUSE: NORMAL MMHG
EOSINOPHIL # BLD AUTO: 0.1 10E3/UL (ref 0–0.7)
EOSINOPHIL NFR BLD AUTO: 2 %
ERYTHROCYTE [DISTWIDTH] IN BLOOD BY AUTOMATED COUNT: 13.6 % (ref 10–15)
GFR SERPL CREATININE-BSD FRML MDRD: >90 ML/MIN/1.73M2
GLUCOSE SERPL-MCNC: 96 MG/DL (ref 70–99)
HCT VFR BLD AUTO: 36.9 % (ref 35–47)
HGB BLD-MCNC: 11.5 G/DL (ref 11.7–15.7)
IMM GRANULOCYTES # BLD: 0 10E3/UL
IMM GRANULOCYTES NFR BLD: 0 %
INTERPRETATION ECG - MUSE: NORMAL
LYMPHOCYTES # BLD AUTO: 1.9 10E3/UL (ref 0.8–5.3)
LYMPHOCYTES NFR BLD AUTO: 43 %
MCH RBC QN AUTO: 31.1 PG (ref 26.5–33)
MCHC RBC AUTO-ENTMCNC: 31.2 G/DL (ref 31.5–36.5)
MCV RBC AUTO: 100 FL (ref 78–100)
MONOCYTES # BLD AUTO: 0.5 10E3/UL (ref 0–1.3)
MONOCYTES NFR BLD AUTO: 10 %
NEUTROPHILS # BLD AUTO: 1.9 10E3/UL (ref 1.6–8.3)
NEUTROPHILS NFR BLD AUTO: 44 %
NRBC # BLD AUTO: 0 10E3/UL
NRBC BLD AUTO-RTO: 0 /100
P AXIS - MUSE: 74 DEGREES
PLATELET # BLD AUTO: 198 10E3/UL (ref 150–450)
POTASSIUM SERPL-SCNC: 3.7 MMOL/L (ref 3.4–5.3)
PR INTERVAL - MUSE: 174 MS
QRS DURATION - MUSE: 80 MS
QT - MUSE: 380 MS
QTC - MUSE: 435 MS
R AXIS - MUSE: 87 DEGREES
RBC # BLD AUTO: 3.7 10E6/UL (ref 3.8–5.2)
SODIUM SERPL-SCNC: 137 MMOL/L (ref 136–145)
SYSTOLIC BLOOD PRESSURE - MUSE: NORMAL MMHG
T AXIS - MUSE: 25 DEGREES
TROPONIN T SERPL HS-MCNC: <6 NG/L
VENTRICULAR RATE- MUSE: 79 BPM
WBC # BLD AUTO: 4.4 10E3/UL (ref 4–11)

## 2022-12-02 PROCEDURE — 250N000013 HC RX MED GY IP 250 OP 250 PS 637: Performed by: EMERGENCY MEDICINE

## 2022-12-02 PROCEDURE — 85025 COMPLETE CBC W/AUTO DIFF WBC: CPT | Performed by: EMERGENCY MEDICINE

## 2022-12-02 PROCEDURE — 84484 ASSAY OF TROPONIN QUANT: CPT | Performed by: EMERGENCY MEDICINE

## 2022-12-02 PROCEDURE — 71046 X-RAY EXAM CHEST 2 VIEWS: CPT

## 2022-12-02 PROCEDURE — 99285 EMERGENCY DEPT VISIT HI MDM: CPT | Mod: 25

## 2022-12-02 PROCEDURE — 80048 BASIC METABOLIC PNL TOTAL CA: CPT | Performed by: EMERGENCY MEDICINE

## 2022-12-02 PROCEDURE — 36415 COLL VENOUS BLD VENIPUNCTURE: CPT | Performed by: EMERGENCY MEDICINE

## 2022-12-02 PROCEDURE — 93005 ELECTROCARDIOGRAM TRACING: CPT

## 2022-12-02 RX ORDER — IBUPROFEN 600 MG/1
600 TABLET, FILM COATED ORAL ONCE
Status: COMPLETED | OUTPATIENT
Start: 2022-12-02 | End: 2022-12-02

## 2022-12-02 RX ADMIN — IBUPROFEN 600 MG: 600 TABLET, FILM COATED ORAL at 06:36

## 2022-12-02 ASSESSMENT — ENCOUNTER SYMPTOMS
FEVER: 0
VOMITING: 0
SHORTNESS OF BREATH: 1
NAUSEA: 0
COUGH: 0
BACK PAIN: 1

## 2022-12-02 ASSESSMENT — ACTIVITIES OF DAILY LIVING (ADL): ADLS_ACUITY_SCORE: 35

## 2022-12-02 NOTE — DISCHARGE INSTRUCTIONS
What do you do next:   Continue your home medications unless we have specifically changed them  You can use over-the-counter acetaminophen (Tylenol ) and ibuprofen for pain control for the next 2 to 3 days.  Acetaminophen (Tylenol): Take 500 to 1000 mg by mouth every 6 hours as needed for fever or pain.  Do not take more than 4000 total milligrams of acetaminophen-containing products in a 24-hour timeframe.  Ibuprofen: Take 600 milligrams by mouth every 6-8 hours as needed for fever or pain.  Take this with food or milk to avoid stomach upset.  Follow up as indicated below    When do you return: If you have uncontrollable pain, severe shortness of breath, lightheadedness/fainting, or any other symptoms that concern you, please return to the ED for reevaluation.    Thank you for allowing us to care for you today.

## 2022-12-02 NOTE — ED PROVIDER NOTES
History   Chief Complaint:  Chest Pain       The history is provided by the patient.      Tye Morales is a 47 year old female who presents with chest pain and back pain. Beginning about one week ago, Tye notes that she began experiencing some chest pain, upper back pain worse on the left side, as well as some shortness of breath. While the back pain and shortness of breath have stayed stable, her chest pain has worsened since onset. During evaluation, Tye denies any fever, cough, nausea, or vomiting. She does note that the chest pain and back pain she has are both worse on the left hand side. There is no family history of heart issues.     Review of Systems   Constitutional: Negative for fever.   Respiratory: Positive for shortness of breath. Negative for cough.    Cardiovascular: Positive for chest pain.   Gastrointestinal: Negative for nausea and vomiting.   Musculoskeletal: Positive for back pain.   All other systems reviewed and are negative.    Allergies:  The patient has no known allergies.     Medications:  The patient is currently on no regular medications.    Past Medical History:     Vitamin D deficiency  GERD  Iron deficiency  Migraine with aura  Systemic lupus erythematosus     Past Surgical History:     section x2     Family History:    The patient denies any family history of heart issues.    Social History:  Patient came from home.  Patient is unaccompanied in the ED.  PCP: Dominga Harris     Physical Exam     Patient Vitals for the past 24 hrs:   BP Temp Temp src Pulse Resp SpO2   22 0316 (!) 142/81 98.6  F (37  C) Temporal 74 20 100 %       Physical Exam  Constitutional: Vital signs reviewed as above.   Head: No external signs of trauma noted.  Eyes: Pupils are equal, round, and reactive to light.   Neck: No JVD noted  Cardiovascular: normal rate, Regular rhythm and normal heart sounds.  No murmur heard. Equal B/L peripheral pulses.  Pulmonary/Chest: Effort  normal and breath sounds normal. No respiratory distress. Patient has no wheezes. Patient has no rales.   Gastrointestinal: Soft. There is no tenderness.   Musculoskeletal/Extremities: No deformities noted. Normal tone.  Neurological: Patient is alert and oriented to person, place, and time.   Skin: Skin is warm and dry. There is no diaphoresis noted.   Psychiatric: The patient appears calm.        Emergency Department Course   ECG  ECG obtained at 0455, ECG read at 0506  Normal sinus rhythm  Normal ECG  No prior ECG available for comparison.  Rate 79 bpm. NY interval 174 ms. QRS duration 80 ms. QT/QTc 380/435 ms. P-R-T axes 74 87 25.    Imaging:  Chest XR,  PA & LAT   Final Result   IMPRESSION: Negative chest.        Report per radiology    Laboratory:  Labs Ordered and Resulted from Time of ED Arrival to Time of ED Departure   CBC WITH PLATELETS AND DIFFERENTIAL - Abnormal       Result Value    WBC Count 4.4      RBC Count 3.70 (*)     Hemoglobin 11.5 (*)     Hematocrit 36.9            MCH 31.1      MCHC 31.2 (*)     RDW 13.6      Platelet Count 198      % Neutrophils 44      % Lymphocytes 43      % Monocytes 10      % Eosinophils 2      % Basophils 1      % Immature Granulocytes 0      NRBCs per 100 WBC 0      Absolute Neutrophils 1.9      Absolute Lymphocytes 1.9      Absolute Monocytes 0.5      Absolute Eosinophils 0.1      Absolute Basophils 0.0      Absolute Immature Granulocytes 0.0      Absolute NRBCs 0.0     BASIC METABOLIC PANEL - Normal    Sodium 137      Potassium 3.7      Chloride 104      Carbon Dioxide (CO2) 22      Anion Gap 11      Urea Nitrogen 10.7      Creatinine 0.51      Calcium 8.9      Glucose 96      GFR Estimate >90     TROPONIN T, HIGH SENSITIVITY - Normal    Troponin T, High Sensitivity <6        Emergency Department Course:       Reviewed:  I reviewed nursing notes, vitals, past medical history and Care Everywhere    Assessments/Consults:  ED Course as of 12/02/22 0916   Fri Dec  02, 2022   0507 I obtained history and examined the patient as noted above.   0633 I rechecked and updated the patient. At this time, the patient was deemed safe to return home, and she agreed to the plan of care.     Interventions:  0636 Ibuprofen 600 mg PO    Disposition:  The patient was discharged to home.     Impression & Plan     CMS Diagnoses: None      Medical Decision Making:     Tye Morales is a 47 year old female presented to the Emergency Department with a complaint of chest pain. Fortunately the workup in the ED has been unremarkable and at this time I am not concerned for ACS. The EKG shows NSR. The troponin is negative . Based on the [facility] high sensitivity troponin pathway, this should rule the patient out for myocardial injury    I considered other possible causes of chest pain including PE (PERC negative), infection, pneumothorax, aortic dissection, and even more benign causes such as reflux and esophageal motility issues. The physical exam, laboratory, and radiological findings listed above make life threatening conditions less likely. At this time I believe the patient is safe for discharge. I have encouraged close outpatient follow up.     Anticipatory guidance given prior to discharge.      Diagnosis:    ICD-10-CM    1. Chest pain, unspecified type  R07.9           Discharge Medications:  Discharge Medication List as of 12/2/2022  6:38 AM          Scribe Disclosure:  [employer], am serving as a scribe at 5:11 AM on 12/2/2022 to document services personally performed by Cong Paulino DO based on my observations and the provider's statements to me.        Cong Paulino DO  12/02/22 0999

## 2022-12-02 NOTE — LETTER
December 2, 2022      To Whom It May Concern:      Tye Morales was seen in our Emergency Department today, 12/02/22.  I expect her condition to improve over the next 2 days.  She may return to work when improved.    Sincerely,        Registered Nurse

## 2023-04-12 ENCOUNTER — APPOINTMENT (OUTPATIENT)
Dept: LAB | Facility: CLINIC | Age: 48
End: 2023-04-12
Payer: COMMERCIAL

## 2023-04-12 ENCOUNTER — VIRTUAL VISIT (OUTPATIENT)
Dept: FAMILY MEDICINE | Facility: CLINIC | Age: 48
End: 2023-04-12
Payer: COMMERCIAL

## 2023-04-12 DIAGNOSIS — R07.9 CHEST PAIN, UNSPECIFIED TYPE: ICD-10-CM

## 2023-04-12 DIAGNOSIS — E61.1 IRON DEFICIENCY: ICD-10-CM

## 2023-04-12 DIAGNOSIS — R30.0 DYSURIA: ICD-10-CM

## 2023-04-12 DIAGNOSIS — M32.9 SYSTEMIC LUPUS ERYTHEMATOSUS, UNSPECIFIED SLE TYPE, UNSPECIFIED ORGAN INVOLVEMENT STATUS (H): ICD-10-CM

## 2023-04-12 DIAGNOSIS — N89.8 VAGINAL DISCHARGE: Primary | ICD-10-CM

## 2023-04-12 DIAGNOSIS — K21.9 GASTROESOPHAGEAL REFLUX DISEASE, UNSPECIFIED WHETHER ESOPHAGITIS PRESENT: ICD-10-CM

## 2023-04-12 LAB
ALBUMIN UR-MCNC: NEGATIVE MG/DL
APPEARANCE UR: CLEAR
BACTERIA #/AREA URNS HPF: ABNORMAL /HPF
BILIRUB UR QL STRIP: NEGATIVE
CLUE CELLS: ABNORMAL
COLOR UR AUTO: YELLOW
GLUCOSE UR STRIP-MCNC: NEGATIVE MG/DL
HCG UR QL: NEGATIVE
HGB UR QL STRIP: NEGATIVE
KETONES UR STRIP-MCNC: NEGATIVE MG/DL
LEUKOCYTE ESTERASE UR QL STRIP: NEGATIVE
MUCOUS THREADS #/AREA URNS LPF: PRESENT /LPF
NITRATE UR QL: NEGATIVE
PH UR STRIP: 6 [PH] (ref 5–7)
RBC #/AREA URNS AUTO: ABNORMAL /HPF
SP GR UR STRIP: 1.02 (ref 1–1.03)
SQUAMOUS #/AREA URNS AUTO: ABNORMAL /LPF
TRICHOMONAS, WET PREP: ABNORMAL
UROBILINOGEN UR STRIP-ACNC: 0.2 E.U./DL
WBC #/AREA URNS AUTO: ABNORMAL /HPF
WBC'S/HIGH POWER FIELD, WET PREP: ABNORMAL
YEAST, WET PREP: ABNORMAL

## 2023-04-12 PROCEDURE — 99213 OFFICE O/P EST LOW 20 MIN: CPT | Mod: VID | Performed by: PHYSICIAN ASSISTANT

## 2023-04-12 PROCEDURE — 81025 URINE PREGNANCY TEST: CPT | Performed by: PHYSICIAN ASSISTANT

## 2023-04-12 PROCEDURE — 81001 URINALYSIS AUTO W/SCOPE: CPT | Performed by: PHYSICIAN ASSISTANT

## 2023-04-12 PROCEDURE — 87210 SMEAR WET MOUNT SALINE/INK: CPT | Performed by: PHYSICIAN ASSISTANT

## 2023-04-12 RX ORDER — FERROUS SULFATE 325(65) MG
325 TABLET ORAL
Qty: 90 TABLET | Refills: 2 | Status: SHIPPED | OUTPATIENT
Start: 2023-04-12 | End: 2023-09-19

## 2023-04-12 RX ORDER — FAMOTIDINE 20 MG/1
20 TABLET, FILM COATED ORAL 2 TIMES DAILY
Qty: 90 TABLET | Refills: 1 | Status: SHIPPED | OUTPATIENT
Start: 2023-04-12 | End: 2023-09-19

## 2023-04-12 NOTE — PROGRESS NOTES
Tye is a 48 year old who is being evaluated via a billable video visit.      How would you like to obtain your AVS? MyChart  If the video visit is dropped, the invitation should be resent by: Text to cell phone: [phone]  Will anyone else be joining your video visit? No      Assessment and Plan:     (N89.8) Vaginal discharge  (primary encounter diagnosis)  Comment: x one week, no abd pain, no new partners or concern for STD  Plan: Wet prep - lab collect  Will go to clinic today for swab    (R30.0) Dysuria  Comment: onset x one week, no systemic symptoms, no abdominal pain  Plan: UA with Microscopic reflex to Culture - lab         collect, HCG Qual, Urine ([id number])  Push fluids, discussed when to be seen promptly   Await UA results    (M32.9) Systemic lupus erythematosus, unspecified SLE type, unspecified organ involvement status (H)  Comment: not currently on medications   Plan: due for an annual physical, rec she make an appt    (E61.1) Iron deficiency  Comment: requesting refills of iron  Plan: ferrous sulfate (FEROSUL) 325 (65 Fe) MG         tablet, famotidine (PEPCID) 20 MG tablet    (K21.9) Gastroesophageal reflux disease, unspecified whether esophagitis present  Comment: requesting med for GERD  Plan: famotidine (PEPCID) 20 MG tablet    (R07.9) Chest pain, unspecified type  Comment: ongoing x 3 months, was seen in ED on 12/2/22 and w/u negative, discussed virtual/phone visit not appropriate for eval of chest pain, needs in person visit today if worsening, if not worsening can be seen in clinic in near future, she agrees  Plan: pcp appointment or to ED or UC if worsens       Dorcas Spence PA-C  Patient was seen with Pauly   30 minutes on the day of the encounter doing chart review, history and exam, documentation and further activities as noted above.        Subjective   Tye is a 48 year old, presenting for the following health issues:  Chest Pain         View : No data to  display.              HPI     Tye is seeing me for possible UTI today  She has noticed some burning with urination x one week  She denies fever/chills, abdominal pain, nausea/vomiting     She has also noticed increased vaginal discharge x one week  She denies any vaginal lesions, new sexual partners or concern for STI    She has also had chest and back pain x 3 months  She was seen in the ED for the pain, work-up was negative but it persists    She would like a few medications refilled today as well    Review of Systems   See above      Objective           Vitals:  No vitals were obtained today due to virtual visit.    Physical Exam     No exam, phone visit         Video-Visit Details    Type of service:  phone visit   Video Start Time: 1:10  Video End Time:1:22 PM    Originating Location (pt. Location): Home    Distant Location (provider location):  On-site  Platform used for Video Visit: Randa

## 2023-04-12 NOTE — RESULT ENCOUNTER NOTE
Please CALL the patient with [place]   Her urine and wet prep were both negative for infection  If her symptoms persist, she should be seen in the clinic in person  If symptoms worsen before she can follow-up, she should go to     Thanks.

## 2023-04-12 NOTE — PATIENT INSTRUCTIONS
Please go to the lab today for urine sample and wet prep    Please make an appointment with your doctor in person asap    Go to the ED if your chest pain worsens

## 2023-05-01 ENCOUNTER — TRANSFERRED RECORDS (OUTPATIENT)
Dept: HEALTH INFORMATION MANAGEMENT | Facility: CLINIC | Age: 48
End: 2023-05-01

## 2023-05-01 PROCEDURE — 88305 TISSUE EXAM BY PATHOLOGIST: CPT | Performed by: PATHOLOGY

## 2023-05-02 ENCOUNTER — LAB REQUISITION (OUTPATIENT)
Dept: LAB | Facility: CLINIC | Age: 48
End: 2023-05-02

## 2023-05-02 DIAGNOSIS — R12 HEARTBURN: ICD-10-CM

## 2023-05-02 DIAGNOSIS — K31.89 OTHER DISEASES OF STOMACH AND DUODENUM: ICD-10-CM

## 2023-05-03 LAB
PATH REPORT.COMMENTS IMP SPEC: NORMAL
PATH REPORT.COMMENTS IMP SPEC: NORMAL
PATH REPORT.FINAL DX SPEC: NORMAL
PATH REPORT.GROSS SPEC: NORMAL
PATH REPORT.MICROSCOPIC SPEC OTHER STN: NORMAL
PATH REPORT.RELEVANT HX SPEC: NORMAL
PHOTO IMAGE: NORMAL

## 2023-06-01 ENCOUNTER — TRANSFERRED RECORDS (OUTPATIENT)
Dept: HEALTH INFORMATION MANAGEMENT | Facility: CLINIC | Age: 48
End: 2023-06-01
Payer: COMMERCIAL

## 2023-06-30 ENCOUNTER — TRANSFERRED RECORDS (OUTPATIENT)
Dept: HEALTH INFORMATION MANAGEMENT | Facility: CLINIC | Age: 48
End: 2023-06-30
Payer: COMMERCIAL

## 2023-09-18 NOTE — PROGRESS NOTES
[facility]  [address]  [address]  [facility] [address]  Phone: [phone]  Fax: [phone]  Primary Provider: No primary care provider on file.  Pre-op Performing Provider: KAI VALVERDE      PREOPERATIVE EVALUATION:  Today's date: 2023    Tye Morales is a 48 year old female who presents for a preoperative evaluation. ( 76)  Surgical Information:  Surgery/Procedure: Right knee repair   Surgery Location: [facility]   Surgeon: Dr. Guajardo  Surgery Date: 23  Time of Surgery: PM  Where patient plans to recover: At home with family  Fax number for surgical facility: [phone]    Assessment & Plan     The proposed surgical procedure is considered INTERMEDIATE risk.    Health Care Home      Pre-op exam  Proceed with surgery at surgeon's discretion.    - VENOUS COLLECTION  - HEMOGRAM PLATELET DIFF (BFP)    Chronic pain of right knee    - VENOUS COLLECTION  - HEMOGRAM PLATELET DIFF (BFP)         - No identified additional risk factors other than previously addressed    Antiplatelet or Anticoagulation Medication Instructions:   - Patient is on no antiplatelet or anticoagulation medications.    Additional Medication Instructions:  Patient is on no additional chronic medications    RECOMMENDATION:  APPROVAL GIVEN to proceed with proposed procedure, without further diagnostic evaluation.      Subjective       HPI related to upcoming procedure: R knee ligament tear, unsure which ligament    1. No - Have you ever had a heart attack or stroke?  2. No - Have you ever had surgery on your heart or blood vessels, such as a stent, coronary (heart) bypass, or surgery on an artery in the head, neck, heart, or legs?  3. No - Do you have chest pain when you are physically active?  4. No - Do you have a history of heart failure?  5. No - Do you currently have a cold, bronchitis, or symptoms of other respiratory (head and chest) infections?  6. No - Do you have  a cough, shortness of breath, or wheezing?  7. No - Do you or anyone in your family have a history of blood clots?  8. No - Do you or anyone in your family have a serious bleeding problem, such as long-lasting bleeding after surgeries or cuts?  9. Yes - Have you ever had anemia or been told to take iron pills? Hx anemia, on and off Fe pills  10. No - Have you had any abnormal blood loss such as black, tarry or bloody stools, or abnormal vaginal bleeding?  11. No - Have you ever had a blood transfusion?  12. Yes - Are you willing to have a blood transfusion if it is medically needed before, during, or after your surgery?  13. No - Have you or anyone in your family ever had problems with anesthesia (sedation for surgery)?  14. No - Do you have sleep apnea, excessive snoring, or daytime drowsiness?   15. No - Do you have any artifical heart valves or other implanted medical devices, such as a pacemaker, defibrillator, or continuous glucose monitor?  16. No - Do you have any artifical joints?  17. No - Are you allergic to latex?  18. No - Is there any chance that you may be pregnant?      Health Care Directive:  Patient does not have a Health Care Directive or Living Will: Discussed advance care planning with patient; information given to patient to review.    Preoperative Review of :   reviewed - controlled substances prescribed by other outside provider(s).      Status of Chronic Conditions:  See problem list for active medical problems.  Problems all longstanding and stable, except as noted/documented.  See ROS for pertinent symptoms related to these conditions.    Review of Systems  CONSTITUTIONAL: NEGATIVE for fever, chills, change in weight  INTEGUMENTARY/SKIN: NEGATIVE for worrisome rashes, moles or lesions  EYES: NEGATIVE for vision changes or irritation  ENT/MOUTH: NEGATIVE for ear, mouth and throat problems  RESP: NEGATIVE for significant cough or SOB  CV: NEGATIVE for chest pain, palpitations or  peripheral edema  GI: NEGATIVE for nausea, abdominal pain, heartburn, or change in bowel habits  : NEGATIVE for frequency, dysuria, or hematuria  NEURO: NEGATIVE for weakness, dizziness or paresthesias  ENDOCRINE: NEGATIVE for temperature intolerance, skin/hair changes  HEME: NEGATIVE for bleeding problems  PSYCHIATRIC: NEGATIVE for changes in mood or affect    Patient Active Problem List    Diagnosis Date Noted    Health Care Home 09/19/2023     Priority: Medium    Systemic lupus erythematosus, unspecified SLE type, unspecified organ involvement status (H) 10/08/2018     Priority: Medium    Migraine with aura and without status migrainosus, not intractable 03/08/2018     Priority: Medium    Iron deficiency 08/15/2016     Priority: Medium    Allergic rhinitis, unspecified allergic rhinitis type 08/15/2016     Priority: Medium    GERD (gastroesophageal reflux disease) 03/11/2014     Priority: Medium    ACP (advance care planning) 08/20/2013     Priority: Medium     Lot:  [id number]      LSIL (low grade squamous intraepithelial lesion) on Pap smear 06/17/2011     Priority: Medium     LSIL on pap smear done 6/17/11. Pt is pregnant. EDC-12/15/11  7/15/11- Anderson Scan done, no biopsies take. Pt to have repeat pap done at post partum visit per .  3/6/12 Pap and HPV-neg. Per  return to annual screening pap      Vitamin D deficiency 06/25/2010     Priority: Medium    External hemorrhoids 07/16/2006     Priority: Medium     Problem list name updated by automated process. Provider to review        Past Medical History:   Diagnosis Date    Abdominal pain, epigastric     Abnormal Pap smear     colposcopy- has since been normal    Anemia     Constipation     Gastro-oesophageal reflux disease     Helicobacter pylori (H. pylori) 8-2007    History of colposcopy with cervical biopsy 7/15/11    [facility]. Scan only- no biopsies    LSIL (low grade squamous intraepithelial lesion) on Pap smear 6/17/11    Neutropenia  "associated with autoimmune disease (H)     Saw hematologist     Past Surgical History:   Procedure Laterality Date     SECTION  2013    Procedure:  SECTION;  primary  section, failure to progress;  Surgeon: Nathan Salinas MD;  Location: [facility]     SECTION N/A 2015    Procedure:  SECTION;  Surgeon: Tanya Levy MD;  Location: [facility]    GYN SURGERY       Current Outpatient Medications   Medication Sig Dispense Refill    Cholecalciferol (VITAMIN D3 PO) Take by mouth daily      multivitamin w/minerals (MULTI-VITAMIN) tablet Take 1 tablet by mouth daily         No Known Allergies       Social History     Tobacco Use    Smoking status: Never     Passive exposure: Never    Smokeless tobacco: Never   Substance Use Topics    Alcohol use: No       History   Drug Use No         Objective     /80 (BP Location: Right arm, Patient Position: Sitting, Cuff Size: Adult Large)   Pulse 87   Temp 98.1  F (36.7  C) (Temporal)   Ht 1.689 m (5' 6.5\")   Wt 94.3 kg (208 lb)   LMP 2023   SpO2 97%   BMI 33.07 kg/m      Physical Exam    GENERAL APPEARANCE: healthy, alert and no distress     EYES: EOMI, PERRL     HENT: ear canals and TM's normal and nose and mouth without ulcers or lesions     NECK: no adenopathy, no asymmetry, masses, or scars and thyroid normal to palpation     RESP: lungs clear to auscultation - no rales, rhonchi or wheezes     CV: regular rates and rhythm, normal S1 S2, no S3 or S4 and no murmur, click or rub     ABDOMEN:  soft, nontender, no HSM or masses and bowel sounds normal     MS: extremities normal- no gross deformities noted, no evidence of inflammation in joints, FROM in all extremities.     SKIN: no suspicious lesions or rashes     NEURO: Normal strength and tone, sensory exam grossly normal, mentation intact and speech normal     PSYCH: mentation appears normal. and affect normal/bright     LYMPHATICS: No cervical " adenopathy    Recent Labs   Lab Test 12/02/22  0503 11/22/22  1738   HGB 11.5* 12.4     --      --    POTASSIUM 3.7  --    CR 0.51  --         Diagnostics:  Recent Results (from the past 24 hour(s))   HEMOGRAM PLATELET DIFF (BFP)    Collection Time: 09/19/23  3:02 PM   Result Value Ref Range    WBC 4.2 4.0 - 11 10*9/L    RBC Count 3.98 3.8 - 5.2 10*12/L    Hemoglobin 12.0 11.7 - 15.7 g/dL    Hematocrit 38.1 35.0 - 47.0 %    MCV 95.8 78 - 100 fL    MCH 30.2 26 - 33 pg    MCHC 31.5 31 - 36 g/dL    Platelet Count 145 (A) 150 - 375 10^9/L    % Granulocytes 37.8 %    % Lymphocytes 53.1 %    % Monocytes 9.1 %      No EKG required, no history of coronary heart disease, significant arrhythmia, peripheral arterial disease or other structural heart disease.    Revised Cardiac Risk Index (RCRI):  The patient has the following serious cardiovascular risks for perioperative complications:   - No serious cardiac risks = 0 points     RCRI Interpretation: 0 points: Class I (very low risk - 0.4% complication rate)         Signed Electronically by: Neymar Moreno PA-C  Copy of this evaluation report is provided to requesting physician.

## 2023-09-19 ENCOUNTER — OFFICE VISIT (OUTPATIENT)
Dept: FAMILY MEDICINE | Facility: CLINIC | Age: 48
End: 2023-09-19

## 2023-09-19 VITALS
WEIGHT: 208 LBS | OXYGEN SATURATION: 97 % | HEIGHT: 67 IN | DIASTOLIC BLOOD PRESSURE: 80 MMHG | BODY MASS INDEX: 32.65 KG/M2 | SYSTOLIC BLOOD PRESSURE: 126 MMHG | HEART RATE: 87 BPM | TEMPERATURE: 98.1 F

## 2023-09-19 DIAGNOSIS — Z01.818 PRE-OP EXAM: Primary | ICD-10-CM

## 2023-09-19 DIAGNOSIS — G89.29 CHRONIC PAIN OF RIGHT KNEE: ICD-10-CM

## 2023-09-19 DIAGNOSIS — M25.561 CHRONIC PAIN OF RIGHT KNEE: ICD-10-CM

## 2023-09-19 DIAGNOSIS — Z76.89 HEALTH CARE HOME: ICD-10-CM

## 2023-09-19 LAB
% GRANULOCYTES: 37.8 %
HCT VFR BLD AUTO: 38.1 % (ref 35–47)
HEMOGLOBIN: 12 G/DL (ref 11.7–15.7)
LYMPHOCYTES NFR BLD AUTO: 53.1 %
MCH RBC QN AUTO: 30.2 PG (ref 26–33)
MCHC RBC AUTO-ENTMCNC: 31.5 G/DL (ref 31–36)
MCV RBC AUTO: 95.8 FL (ref 78–100)
MONOCYTES NFR BLD AUTO: 9.1 %
PLATELET COUNT - QUEST: 145 10^9/L (ref 150–375)
RBC # BLD AUTO: 3.98 10*12/L (ref 3.8–5.2)
WBC # BLD AUTO: 4.2 10*9/L (ref 4–11)

## 2023-09-19 PROCEDURE — 85025 COMPLETE CBC W/AUTO DIFF WBC: CPT | Performed by: PHYSICIAN ASSISTANT

## 2023-09-19 PROCEDURE — 36415 COLL VENOUS BLD VENIPUNCTURE: CPT | Performed by: PHYSICIAN ASSISTANT

## 2023-09-19 PROCEDURE — 99204 OFFICE O/P NEW MOD 45 MIN: CPT | Performed by: PHYSICIAN ASSISTANT

## 2023-09-19 RX ORDER — MULTIPLE VITAMINS W/ MINERALS TAB 9MG-400MCG
1 TAB ORAL DAILY
COMMUNITY
End: 2023-09-27

## 2023-09-19 NOTE — NURSING NOTE
Chief Complaint   Patient presents with    New Patient     New patient to our clinic    Pre-Op Exam     Surgery/Procedure: Right knee repair   Surgery Location: [facility]   Surgeon: Dr. Guajardo  Surgery Date: 09/26/23

## 2023-09-19 NOTE — LETTER
2023        RE: Tye Morales  [address]  [facility] [address]        [facility]  [address]  [address]  [facility] [address]  Phone: [phone]  Fax: [phone]  Primary Provider: No primary care provider on file.  Pre-op Performing Provider: KAI VALVERDE      PREOPERATIVE EVALUATION:  Today's date: 2023    Tye Morales is a 48 year old female who presents for a preoperative evaluation. ( 76)  Surgical Information:  Surgery/Procedure: Right knee repair   Surgery Location: [facility]   Surgeon: Dr. Guajardo  Surgery Date: 23  Time of Surgery: PM  Where patient plans to recover: At home with family  Fax number for surgical facility: [phone]    Assessment & Plan    The proposed surgical procedure is considered INTERMEDIATE risk.    Health Care Home      Pre-op exam  Proceed with surgery at surgeon's discretion.    - VENOUS COLLECTION  - HEMOGRAM PLATELET DIFF (BFP)    Chronic pain of right knee    - VENOUS COLLECTION  - HEMOGRAM PLATELET DIFF (BFP)         - No identified additional risk factors other than previously addressed    Antiplatelet or Anticoagulation Medication Instructions:   - Patient is on no antiplatelet or anticoagulation medications.    Additional Medication Instructions:  Patient is on no additional chronic medications    RECOMMENDATION:  APPROVAL GIVEN to proceed with proposed procedure, without further diagnostic evaluation.      Subjective      HPI related to upcoming procedure: R knee ligament tear, unsure which ligament    1. No - Have you ever had a heart attack or stroke?  2. No - Have you ever had surgery on your heart or blood vessels, such as a stent, coronary (heart) bypass, or surgery on an artery in the head, neck, heart, or legs?  3. No - Do you have chest pain when you are physically active?  4. No - Do you have a history of heart failure?  5. No - Do you currently have a cold, bronchitis,  or symptoms of other respiratory (head and chest) infections?  6. No - Do you have a cough, shortness of breath, or wheezing?  7. No - Do you or anyone in your family have a history of blood clots?  8. No - Do you or anyone in your family have a serious bleeding problem, such as long-lasting bleeding after surgeries or cuts?  9. Yes - Have you ever had anemia or been told to take iron pills? Hx anemia, on and off Fe pills  10. No - Have you had any abnormal blood loss such as black, tarry or bloody stools, or abnormal vaginal bleeding?  11. No - Have you ever had a blood transfusion?  12. Yes - Are you willing to have a blood transfusion if it is medically needed before, during, or after your surgery?  13. No - Have you or anyone in your family ever had problems with anesthesia (sedation for surgery)?  14. No - Do you have sleep apnea, excessive snoring, or daytime drowsiness?   15. No - Do you have any artifical heart valves or other implanted medical devices, such as a pacemaker, defibrillator, or continuous glucose monitor?  16. No - Do you have any artifical joints?  17. No - Are you allergic to latex?  18. No - Is there any chance that you may be pregnant?      Health Care Directive:  Patient does not have a Health Care Directive or Living Will: Discussed advance care planning with patient; information given to patient to review.    Preoperative Review of :   reviewed - controlled substances prescribed by other outside provider(s).      Status of Chronic Conditions:  See problem list for active medical problems.  Problems all longstanding and stable, except as noted/documented.  See ROS for pertinent symptoms related to these conditions.    Review of Systems  CONSTITUTIONAL: NEGATIVE for fever, chills, change in weight  INTEGUMENTARY/SKIN: NEGATIVE for worrisome rashes, moles or lesions  EYES: NEGATIVE for vision changes or irritation  ENT/MOUTH: NEGATIVE for ear, mouth and throat problems  RESP: NEGATIVE  for significant cough or SOB  CV: NEGATIVE for chest pain, palpitations or peripheral edema  GI: NEGATIVE for nausea, abdominal pain, heartburn, or change in bowel habits  : NEGATIVE for frequency, dysuria, or hematuria  NEURO: NEGATIVE for weakness, dizziness or paresthesias  ENDOCRINE: NEGATIVE for temperature intolerance, skin/hair changes  HEME: NEGATIVE for bleeding problems  PSYCHIATRIC: NEGATIVE for changes in mood or affect    Patient Active Problem List    Diagnosis Date Noted     Health Care Home 09/19/2023     Priority: Medium     Systemic lupus erythematosus, unspecified SLE type, unspecified organ involvement status (H) 10/08/2018     Priority: Medium     Migraine with aura and without status migrainosus, not intractable 03/08/2018     Priority: Medium     Iron deficiency 08/15/2016     Priority: Medium     Allergic rhinitis, unspecified allergic rhinitis type 08/15/2016     Priority: Medium     GERD (gastroesophageal reflux disease) 03/11/2014     Priority: Medium     ACP (advance care planning) 08/20/2013     Priority: Medium     Lot:  [id number]       LSIL (low grade squamous intraepithelial lesion) on Pap smear 06/17/2011     Priority: Medium     LSIL on pap smear done 6/17/11. Pt is pregnant. EDC-12/15/11  7/15/11- [place] Scan done, no biopsies take. Pt to have repeat pap done at post partum visit per .  3/6/12 Pap and HPV-neg. Per  return to annual screening pap       Vitamin D deficiency 06/25/2010     Priority: Medium     External hemorrhoids 07/16/2006     Priority: Medium     Problem list name updated by automated process. Provider to review        Past Medical History:   Diagnosis Date     Abdominal pain, epigastric      Abnormal Pap smear     colposcopy- has since been normal     Anemia      Constipation      Gastro-oesophageal reflux disease      Helicobacter pylori (H. pylori) 8-2007     History of colposcopy with cervical biopsy 7/15/11    [place]. Scan only- no biopsies      "LSIL (low grade squamous intraepithelial lesion) on Pap smear 11     Neutropenia associated with autoimmune disease (H)     Saw hematologist     Past Surgical History:   Procedure Laterality Date      SECTION  2013    Procedure:  SECTION;  primary  section, failure to progress;  Surgeon: Nathan Salinas MD;  Location:  L+D      SECTION N/A 2015    Procedure:  SECTION;  Surgeon: Tanya Levy MD;  Location:  L+D     GYN SURGERY       Current Outpatient Medications   Medication Sig Dispense Refill     Cholecalciferol (VITAMIN D3 PO) Take by mouth daily       multivitamin w/minerals (MULTI-VITAMIN) tablet Take 1 tablet by mouth daily         No Known Allergies       Social History     Tobacco Use     Smoking status: Never     Passive exposure: Never     Smokeless tobacco: Never   Substance Use Topics     Alcohol use: No       History   Drug Use No         Objective    /80 (BP Location: Right arm, Patient Position: Sitting, Cuff Size: Adult Large)   Pulse 87   Temp 98.1  F (36.7  C) (Temporal)   Ht 1.689 m (5' 6.5\")   Wt 94.3 kg (208 lb)   LMP 2023   SpO2 97%   BMI 33.07 kg/m      Physical Exam    GENERAL APPEARANCE: healthy, alert and no distress     EYES: EOMI, PERRL     HENT: ear canals and TM's normal and nose and mouth without ulcers or lesions     NECK: no adenopathy, no asymmetry, masses, or scars and thyroid normal to palpation     RESP: lungs clear to auscultation - no rales, rhonchi or wheezes     CV: regular rates and rhythm, normal S1 S2, no S3 or S4 and no murmur, click or rub     ABDOMEN:  soft, nontender, no HSM or masses and bowel sounds normal     MS: extremities normal- no gross deformities noted, no evidence of inflammation in joints, FROM in all extremities.     SKIN: no suspicious lesions or rashes     NEURO: Normal strength and tone, sensory exam grossly normal, mentation intact and speech normal     PSYCH: " mentation appears normal. and affect normal/bright     LYMPHATICS: No cervical adenopathy    Recent Labs   Lab Test 12/02/22  0503 11/22/22  1738   HGB 11.5* 12.4     --      --    POTASSIUM 3.7  --    CR 0.51  --         Diagnostics:  Recent Results (from the past 24 hour(s))   HEMOGRAM PLATELET DIFF (BFP)    Collection Time: 09/19/23  3:02 PM   Result Value Ref Range    WBC 4.2 4.0 - 11 10*9/L    RBC Count 3.98 3.8 - 5.2 10*12/L    Hemoglobin 12.0 11.7 - 15.7 g/dL    Hematocrit 38.1 35.0 - 47.0 %    MCV 95.8 78 - 100 fL    MCH 30.2 26 - 33 pg    MCHC 31.5 31 - 36 g/dL    Platelet Count 145 (A) 150 - 375 10^9/L    % Granulocytes 37.8 %    % Lymphocytes 53.1 %    % Monocytes 9.1 %      No EKG required, no history of coronary heart disease, significant arrhythmia, peripheral arterial disease or other structural heart disease.    Revised Cardiac Risk Index (RCRI):  The patient has the following serious cardiovascular risks for perioperative complications:   - No serious cardiac risks = 0 points     RCRI Interpretation: 0 points: Class I (very low risk - 0.4% complication rate)         Signed Electronically by: Neymar Moreno PA-C  Copy of this evaluation report is provided to requesting physician.          Sincerely,        Neymar oMreno PA-C

## 2023-09-19 NOTE — LETTER
[facility]  [address]  [address]  [facility] [address]  [phone]      September 19, 2023      Tye Morales  [address]  [facility] [address]      EMERGENCY CARE PLAN  Presenting Problem Treatment Plan   Questions or concerns during clinic hours I will call the clinic directly:    [facility]  [address] , [address]  [place] [address]  [phone]   Questions or concerns outside clinic hours  I will call the 24 hour line at [phone]   Patient needs to schedule an appointment  I will call the  scheduling line at [phone]   Same day treatment   I will call the clinic first, then  urgent care and/or  express care if needed   Clinic Care Coordinators Kenia Baez RN:  [phone]  [facility] Clinical Support Staff: [phone]    Crisis Services:  Behavioral or Mental Health P (Behavioral Health Providers)   [phone]   Emergency treatment--Immediately CALL [phone]

## 2023-09-27 ENCOUNTER — OFFICE VISIT (OUTPATIENT)
Dept: INTERNAL MEDICINE | Facility: CLINIC | Age: 48
End: 2023-09-27
Payer: COMMERCIAL

## 2023-09-27 VITALS
HEART RATE: 81 BPM | BODY MASS INDEX: 32.6 KG/M2 | OXYGEN SATURATION: 97 % | HEIGHT: 67 IN | RESPIRATION RATE: 18 BRPM | WEIGHT: 207.7 LBS | SYSTOLIC BLOOD PRESSURE: 120 MMHG | TEMPERATURE: 98.3 F | DIASTOLIC BLOOD PRESSURE: 76 MMHG

## 2023-09-27 DIAGNOSIS — N89.8 VAGINAL ITCHING: ICD-10-CM

## 2023-09-27 DIAGNOSIS — R21 RASH: ICD-10-CM

## 2023-09-27 DIAGNOSIS — N92.6 MISSED PERIOD: ICD-10-CM

## 2023-09-27 DIAGNOSIS — N64.4 PAIN OF LEFT BREAST: ICD-10-CM

## 2023-09-27 DIAGNOSIS — R39.15 URINARY URGENCY: Primary | ICD-10-CM

## 2023-09-27 LAB
ALBUMIN UR-MCNC: NEGATIVE MG/DL
APPEARANCE UR: CLEAR
BILIRUB UR QL STRIP: NEGATIVE
CLUE CELLS: ABNORMAL
COLOR UR AUTO: YELLOW
GLUCOSE UR STRIP-MCNC: NEGATIVE MG/DL
HCG UR QL: NEGATIVE
HGB UR QL STRIP: NEGATIVE
KETONES UR STRIP-MCNC: NEGATIVE MG/DL
LEUKOCYTE ESTERASE UR QL STRIP: NEGATIVE
NITRATE UR QL: NEGATIVE
PH UR STRIP: 6 [PH] (ref 5–7)
SP GR UR STRIP: 1.02 (ref 1–1.03)
TRICHOMONAS, WET PREP: ABNORMAL
UROBILINOGEN UR STRIP-ACNC: 0.2 E.U./DL
WBC'S/HIGH POWER FIELD, WET PREP: ABNORMAL
YEAST, WET PREP: ABNORMAL

## 2023-09-27 PROCEDURE — 99214 OFFICE O/P EST MOD 30 MIN: CPT | Performed by: INTERNAL MEDICINE

## 2023-09-27 PROCEDURE — 81003 URINALYSIS AUTO W/O SCOPE: CPT | Performed by: INTERNAL MEDICINE

## 2023-09-27 PROCEDURE — 81025 URINE PREGNANCY TEST: CPT | Performed by: INTERNAL MEDICINE

## 2023-09-27 PROCEDURE — 87210 SMEAR WET MOUNT SALINE/INK: CPT | Performed by: INTERNAL MEDICINE

## 2023-09-27 RX ORDER — TRIAMCINOLONE ACETONIDE 0.25 MG/G
OINTMENT TOPICAL 2 TIMES DAILY
Qty: 80 G | Refills: 0 | Status: SHIPPED | OUTPATIENT
Start: 2023-09-27

## 2023-09-27 RX ORDER — OXYBUTYNIN CHLORIDE 5 MG/1
5 TABLET, EXTENDED RELEASE ORAL DAILY
Qty: 90 TABLET | Refills: 1 | Status: SHIPPED | OUTPATIENT
Start: 2023-09-27

## 2023-09-27 NOTE — PROGRESS NOTES
Assessment & Plan   Urinary urgency  U/A WNL today. Unclear etiology. Will treat symptomatically with oxybutynin and monitor. If persistent, may benefit from urology consult.  - UA Macroscopic with reflex to Microscopic and Culture  - oxyBUTYnin ER (DITROPAN XL) 5 MG 24 hr tablet; Take 1 tablet (5 mg) by mouth daily    Vaginal itching  Mild. Wet prep reassuring. Monitor.  - Wet prep - Clinic Collect    Missed period  Pregnancy test negative today in clinic.  - HCG qualitative urine    Pain of left breast  Recommended further imaging, she was in agreement. Orders placed.  - MA Diagnostic Digital Bilateral; Future  - US Breast Left Limited 1-3 Quadrants; Future    Rash  Trial of topical corticosteroids. F/u with PCP.  - triamcinolone (KENALOG) 0.025 % external ointment; Apply topically 2 times daily To area of irritation    F/u with regular PCP at regular interval or sooner NICK Restrepo MD  [facility]    Nicole Gamble is a 48 year old who presents for a next day acute care visit with chief concern of:  Breast Pain  This is the first time I have met Lillieangela, who typically gets care at clinics closer to her residence.    History of Present Illness       Reason for visit:  Pain in the left breast and infection  Symptoms include:  UTI Symptoms, Frequency urgency, back pain, lower abdomen pain, itching    She eats 0-1 servings of fruits and vegetables daily.She consumes 3 sweetened beverage(s) daily.She exercises with enough effort to increase her heart rate 9 or less minutes per day.  She exercises with enough effort to increase her heart rate 3 or less days per week.   She is taking medications regularly.     Two weeks of L breast pain. Denies nipple discharge or lump in breast. Hurts most of the day. Not cyclical. Last mammogram was ~5 years ago. Reports polyuria and urinary urgency. No blood in urine. No f/c. Reports itchy rash on arm.    Review of Systems  "  Constitutional, gyn, breast, gu systems are negative, except as otherwise noted.      Objective    /76   Pulse 81   Temp 98.3  F (36.8  C) (Tympanic)   Resp 18   Ht 1.689 m (5' 6.5\")   Wt 94.2 kg (207 lb 11.2 oz)   LMP 08/26/2023   SpO2 97%   BMI 33.02 kg/m    Body mass index is 33.02 kg/m .    Physical Exam   GENERAL: alert and in no distress.  EYES: conjunctivae/corneas clear. EOMs grossly intact  HENT: Facies symmetric.  RESP: No iWOB.  BREAST: Deferred  GI: ND, mild TTP over suprapubic area. No CVA tenderness bilaterally.  MSK: Moves all four extremities freely  SKIN: Mild irritation of skin of L forearm with excoriations.  NEURO: CN II-XII grossly intact.    Results for orders placed or performed in visit on 09/27/23 (from the past 24 hour(s))   UA Macroscopic with reflex to Microscopic and Culture    Specimen: Urine, Clean Catch   Result Value Ref Range    Color Urine Yellow Colorless, Straw, Light Yellow, Yellow    Appearance Urine Clear Clear    Glucose Urine Negative Negative mg/dL    Bilirubin Urine Negative Negative    Ketones Urine Negative Negative mg/dL    Specific Gravity Urine 1.025 1.003 - 1.035    Blood Urine Negative Negative    pH Urine 6.0 5.0 - 7.0    Protein Albumin Urine Negative Negative mg/dL    Urobilinogen Urine 0.2 0.2, 1.0 E.U./dL    Nitrite Urine Negative Negative    Leukocyte Esterase Urine Negative Negative    Narrative    Microscopic not indicated   Wet prep - Clinic Collect    Specimen: Vagina; Swab   Result Value Ref Range    Trichomonas Absent Absent    Yeast Absent Absent    Clue Cells Absent Absent    WBCs/high power field 1+ (A) None     "

## 2023-09-27 NOTE — PATIENT INSTRUCTIONS
- Call [phone] to schedule your ultrasound and mammogram with our centralized imaging schedulers    - [facility] [phone] leilani vega ultrasound-kelly [facility]   Declines

## 2024-02-28 ENCOUNTER — APPOINTMENT (OUTPATIENT)
Dept: INTERPRETER SERVICES | Facility: CLINIC | Age: 49
End: 2024-02-28
Payer: COMMERCIAL

## 2024-03-15 ENCOUNTER — HOSPITAL ENCOUNTER (OUTPATIENT)
Dept: MAMMOGRAPHY | Facility: CLINIC | Age: 49
Discharge: HOME | End: 2024-03-15
Attending: INTERNAL MEDICINE
Payer: COMMERCIAL

## 2024-03-15 ENCOUNTER — HOSPITAL ENCOUNTER (OUTPATIENT)
Dept: ULTRASOUND IMAGING | Facility: CLINIC | Age: 49
Discharge: HOME | End: 2024-03-15
Attending: INTERNAL MEDICINE
Payer: COMMERCIAL

## 2024-03-15 DIAGNOSIS — N64.4 MASTODYNIA: ICD-10-CM

## 2024-03-15 PROCEDURE — 76642 ULTRASOUND BREAST LIMITED: CPT | Mod: LT

## 2024-03-15 PROCEDURE — 77062 BREAST TOMOSYNTHESIS BI: CPT

## 2024-06-17 PROBLEM — Z76.89 HEALTH CARE HOME: Status: RESOLVED | Noted: 2023-09-19 | Resolved: 2024-06-17

## 2025-04-14 ENCOUNTER — OFFICE VISIT (OUTPATIENT)
Dept: URGENT CARE | Facility: URGENT CARE | Age: 50
End: 2025-04-14
Payer: COMMERCIAL

## 2025-04-14 VITALS
DIASTOLIC BLOOD PRESSURE: 82 MMHG | WEIGHT: 184.6 LBS | HEART RATE: 87 BPM | OXYGEN SATURATION: 99 % | BODY MASS INDEX: 29.35 KG/M2 | SYSTOLIC BLOOD PRESSURE: 126 MMHG | TEMPERATURE: 98 F

## 2025-04-14 DIAGNOSIS — R05.8 PRODUCTIVE COUGH: ICD-10-CM

## 2025-04-14 DIAGNOSIS — J20.9 ACUTE BRONCHITIS WITH SYMPTOMS > 10 DAYS: Primary | ICD-10-CM

## 2025-04-14 PROCEDURE — 3079F DIAST BP 80-89 MM HG: CPT | Performed by: PHYSICIAN ASSISTANT

## 2025-04-14 PROCEDURE — 99214 OFFICE O/P EST MOD 30 MIN: CPT | Performed by: PHYSICIAN ASSISTANT

## 2025-04-14 PROCEDURE — 3074F SYST BP LT 130 MM HG: CPT | Performed by: PHYSICIAN ASSISTANT

## 2025-04-14 RX ORDER — DOXYCYCLINE 100 MG/1
100 CAPSULE ORAL 2 TIMES DAILY
Qty: 20 CAPSULE | Refills: 0 | Status: SHIPPED | OUTPATIENT
Start: 2025-04-14

## 2025-04-14 RX ORDER — BENZONATATE 200 MG/1
200 CAPSULE ORAL 3 TIMES DAILY PRN
Qty: 21 CAPSULE | Refills: 0 | Status: SHIPPED | OUTPATIENT
Start: 2025-04-14 | End: 2025-04-21

## 2025-04-14 NOTE — PROGRESS NOTES
Assessment & Plan     Acute bronchitis with symptoms > 10 days    Bronchitis is inflammation of the bronchial tubes, which carry air to the lungs. The tubes swell and produce mucus, or phlegm. The mucus and inflamed bronchial tubes make you cough. You may have trouble breathing.  Most cases of bronchitis are caused by viruses but in your case we are more concerned about a bacterial infection. . Antibiotics usually are helpful in this situation to help you clear this bacterial infection.  Bronchitis usually develops rapidly and lasts about 2 to 3 weeks in otherwise healthy people.    - doxycycline monohydrate (MONODOX) 100 MG capsule  Dispense: 20 capsule; Refill: 0  - benzonatate (TESSALON) 200 MG capsule  Dispense: 21 capsule; Refill: 0    Productive cough    Doxy for infection  Tessalon for coughing  - doxycycline monohydrate (MONODOX) 100 MG capsule  Dispense: 20 capsule; Refill: 0       At today's visit with Tye Morales , we discussed results, diagnosis, medications and formulated a plan.  We also discussed red flags for immediate return to clinic/ER, as well as indications for follow up with PCP if not improved in 3 days. Patient understood and agreed to plan. Tye Morales was discharged with stable vitals and has no further questions.       No follow-ups on file.    Jimmy Pathak, [facility], PA-C  [facility] U[facility]    Nicole Gamble is a 50 year old female who presents to clinic today for the following health issues:  Chief Complaint   Patient presents with    Cold Symptoms     Coughing for a week and lost her voice. She feels hot then cold. She gets so hot she needs cold water. She feels pain in her chest and throat from coughing. She is coughing so much she has thrown up.         4/14/2025     6:19 PM   Additional Questions   Roomed by Lilliam   Accompanied by Son Annette Harley     HPI  Review of Systems  Constitutional, HEENT, cardiovascular, pulmonary, GI, , musculoskeletal,  neuro, skin, endocrine and psych systems are negative, except as otherwise noted.      Objective    /82   Pulse 87   Temp 98  F (36.7  C)   Wt 83.7 kg (184 lb 9.6 oz)   SpO2 99%   BMI 29.35 kg/m    Physical Exam   GENERAL: alert and no distress  EYES: Eyes grossly normal to inspection, PERRL and conjunctivae and sclerae normal  HENT: ear canals and TM's normal, nose and mouth without ulcers or lesions  NECK: no adenopathy, no asymmetry, masses, or scars  RESP: lungs clear to auscultation - no rales, rhonchi or wheezes  CV: regular rate and rhythm, normal S1 S2, no S3 or S4, no murmur, click or rub, no peripheral edema  MS: no gross musculoskeletal defects noted, no edema  SKIN: no suspicious lesions or rashes  NEURO: Normal strength and tone, mentation intact and speech normal  PSYCH: mentation appears normal, affect normal/bright    No results found for any visits on 04/14/25.